# Patient Record
Sex: FEMALE | Race: WHITE | NOT HISPANIC OR LATINO | Employment: FULL TIME | URBAN - METROPOLITAN AREA
[De-identification: names, ages, dates, MRNs, and addresses within clinical notes are randomized per-mention and may not be internally consistent; named-entity substitution may affect disease eponyms.]

---
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
## 2022-04-11 ENCOUNTER — HISTORICAL (OUTPATIENT)
Dept: ADMINISTRATIVE | Facility: HOSPITAL | Age: 22
End: 2022-04-11
Payer: COMMERCIAL

## 2022-04-24 VITALS
DIASTOLIC BLOOD PRESSURE: 70 MMHG | WEIGHT: 173.75 LBS | SYSTOLIC BLOOD PRESSURE: 103 MMHG | OXYGEN SATURATION: 98 % | HEIGHT: 63 IN | BODY MASS INDEX: 30.79 KG/M2

## 2022-05-15 ENCOUNTER — OFFICE VISIT (OUTPATIENT)
Dept: URGENT CARE | Facility: CLINIC | Age: 22
End: 2022-05-15
Payer: COMMERCIAL

## 2022-05-15 VITALS
TEMPERATURE: 99 F | WEIGHT: 160 LBS | HEIGHT: 61 IN | OXYGEN SATURATION: 98 % | HEART RATE: 74 BPM | SYSTOLIC BLOOD PRESSURE: 115 MMHG | DIASTOLIC BLOOD PRESSURE: 80 MMHG | BODY MASS INDEX: 30.21 KG/M2 | RESPIRATION RATE: 16 BRPM

## 2022-05-15 DIAGNOSIS — U07.1 COVID-19: Primary | ICD-10-CM

## 2022-05-15 DIAGNOSIS — R09.81 SINUS CONGESTION: ICD-10-CM

## 2022-05-15 LAB
CTP QC/QA: YES
SARS-COV-2 RDRP RESP QL NAA+PROBE: POSITIVE

## 2022-05-15 PROCEDURE — 3074F SYST BP LT 130 MM HG: CPT | Mod: CPTII,,, | Performed by: FAMILY MEDICINE

## 2022-05-15 PROCEDURE — 3008F BODY MASS INDEX DOCD: CPT | Mod: CPTII,,, | Performed by: FAMILY MEDICINE

## 2022-05-15 PROCEDURE — 99202 PR OFFICE/OUTPT VISIT, NEW, LEVL II, 15-29 MIN: ICD-10-PCS | Mod: ,,, | Performed by: FAMILY MEDICINE

## 2022-05-15 PROCEDURE — 3008F PR BODY MASS INDEX (BMI) DOCUMENTED: ICD-10-PCS | Mod: CPTII,,, | Performed by: FAMILY MEDICINE

## 2022-05-15 PROCEDURE — 1159F MED LIST DOCD IN RCRD: CPT | Mod: CPTII,,, | Performed by: FAMILY MEDICINE

## 2022-05-15 PROCEDURE — 1160F RVW MEDS BY RX/DR IN RCRD: CPT | Mod: CPTII,,, | Performed by: FAMILY MEDICINE

## 2022-05-15 PROCEDURE — 99202 OFFICE O/P NEW SF 15 MIN: CPT | Mod: ,,, | Performed by: FAMILY MEDICINE

## 2022-05-15 PROCEDURE — 3074F PR MOST RECENT SYSTOLIC BLOOD PRESSURE < 130 MM HG: ICD-10-PCS | Mod: CPTII,,, | Performed by: FAMILY MEDICINE

## 2022-05-15 PROCEDURE — U0002: ICD-10-PCS | Mod: QW,,, | Performed by: FAMILY MEDICINE

## 2022-05-15 PROCEDURE — 1159F PR MEDICATION LIST DOCUMENTED IN MEDICAL RECORD: ICD-10-PCS | Mod: CPTII,,, | Performed by: FAMILY MEDICINE

## 2022-05-15 PROCEDURE — 1160F PR REVIEW ALL MEDS BY PRESCRIBER/CLIN PHARMACIST DOCUMENTED: ICD-10-PCS | Mod: CPTII,,, | Performed by: FAMILY MEDICINE

## 2022-05-15 PROCEDURE — 3079F DIAST BP 80-89 MM HG: CPT | Mod: CPTII,,, | Performed by: FAMILY MEDICINE

## 2022-05-15 PROCEDURE — U0002 COVID-19 LAB TEST NON-CDC: HCPCS | Mod: QW,,, | Performed by: FAMILY MEDICINE

## 2022-05-15 PROCEDURE — 3079F PR MOST RECENT DIASTOLIC BLOOD PRESSURE 80-89 MM HG: ICD-10-PCS | Mod: CPTII,,, | Performed by: FAMILY MEDICINE

## 2022-05-15 RX ORDER — FLUOXETINE 10 MG/1
10 CAPSULE ORAL DAILY
Status: ON HOLD | COMMUNITY
End: 2023-01-17

## 2022-05-15 RX ORDER — AMPHETAMINE 12.5 MG/1
TABLET, ORALLY DISINTEGRATING ORAL
Status: ON HOLD | COMMUNITY
End: 2023-01-17

## 2022-05-15 NOTE — PROGRESS NOTES
"Subjective:       Patient ID: Komal Catherine is a 21 y.o. female.    Vitals:  height is 5' 1" (1.549 m) and weight is 72.6 kg (160 lb). Her temperature is 98.8 °F (37.1 °C). Her blood pressure is 115/80 and her pulse is 74. Her respiration is 16 and oxygen saturation is 98%.     Chief Complaint: Sinus Problem    Sinus Problem  This is a new problem. The current episode started today. The problem is unchanged. Her pain is at a severity of 0/10. Associated symptoms include congestion, ear pain, sinus pressure and a sore throat. Treatments tried: nyquil. The treatment provided no relief.       HENT: Positive for ear pain, congestion, sinus pressure and sore throat.      Patient is seen and evaluated in an limited status for concern for COVID-19. In order to decrease the risk of exposure to the hospital and health care workers, only a limited exam was done.   21-year-old present to clinic with concerns of nasal congestion, bilateral ear pressure, sinus pressure and sore throat since yesterday evening.  Possible exposure to infections.  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  Vaccinated for COVID-19.  Denies chest pain shortness over or wheezing    Provider Precautions  N95 mask  Gloves    Covid Screening  No confirmation close contact  No travel to high risk area  No recent testing done    Review of Systems  Constitutional _No fever, body aches, headache  ENMT_sore throat, nasal congestion and postnasal drip  Respiratory_coughing, no shortness of breath or wheezing  Cardiovascular_no chest pain, no palpitations  Gastrointestinal_negative for nausea or vomiting  Integumentary_negative for rash  Objective:      Physical Exam    General : Alert and Oriented, No apparent distress, afebrile  Neck - supple  HENT : Oropharynx no redness or swelling.  TMs intact mild fluid no redness.   Respiratory : Bilateral equal breath sounds, nonlabored respirations  Cardiovascular : Rate, rhythm regular, normal volume pulse, no " murmur  Integumentary : Warm, Dry and no rash  Assessment:       1. COVID-19    2. Sinus congestion          Plan:       With Concern for COVID-19 infection. Swabs obtained today. COVID-19 Test positive  Adequate hydration and rest. Monitor the symptoms closely  Signs and symptoms that should prompt reevaluation discussed as well  Recommendation is to follow a timeline quarantine measures per CDC and [facility]  Tylenol as needed for fever, body aches and headache. Antihistamine of choice for congestion.   Robitussin-DM for cough and cold as needed and as directed. Can try vitamin C, zinc 50 mg, vitamin D3 5000 IU for 10 days.  At home quarantine instructions for 5 days since start of symptoms, no fever (100.4 and above) for 24 hours and with improved symptoms can stop home quarantine  Self-care and home quarantine instructions like- Wear a mask, cover your cough and sneeze. Clean any shared surfaces  Call or return to clinic for any questions. ER precautions with any acute change in symptoms. Follow up with primary MD  COVID-19    Sinus congestion  -     POCT COVID-19 Rapid Screening    Work excuse rest of the week

## 2022-11-16 ENCOUNTER — HOSPITAL ENCOUNTER (EMERGENCY)
Facility: HOSPITAL | Age: 22
Discharge: HOME | End: 2022-11-17
Attending: FAMILY MEDICINE
Payer: COMMERCIAL

## 2022-11-16 DIAGNOSIS — J02.9 VIRAL PHARYNGITIS: ICD-10-CM

## 2022-11-16 DIAGNOSIS — J02.9 SORE THROAT: Primary | ICD-10-CM

## 2022-11-16 LAB
B-HCG UR QL: NEGATIVE
CTP QC/QA: YES

## 2022-11-16 PROCEDURE — 96372 THER/PROPH/DIAG INJ SC/IM: CPT | Performed by: PHYSICIAN ASSISTANT

## 2022-11-16 PROCEDURE — 0240U COVID/FLU A&B PCR: CPT | Performed by: FAMILY MEDICINE

## 2022-11-16 PROCEDURE — 87651 STREP A DNA AMP PROBE: CPT | Performed by: FAMILY MEDICINE

## 2022-11-16 PROCEDURE — 99284 EMERGENCY DEPT VISIT MOD MDM: CPT | Mod: 25

## 2022-11-16 PROCEDURE — 63600175 PHARM REV CODE 636 W HCPCS: Performed by: PHYSICIAN ASSISTANT

## 2022-11-16 PROCEDURE — 81025 URINE PREGNANCY TEST: CPT | Performed by: FAMILY MEDICINE

## 2022-11-16 PROCEDURE — 25000003 PHARM REV CODE 250: Performed by: PHYSICIAN ASSISTANT

## 2022-11-16 RX ORDER — KETOROLAC TROMETHAMINE 30 MG/ML
30 INJECTION, SOLUTION INTRAMUSCULAR; INTRAVENOUS
Status: COMPLETED | OUTPATIENT
Start: 2022-11-16 | End: 2022-11-16

## 2022-11-16 RX ORDER — METHOCARBAMOL 100 MG/ML
500 INJECTION, SOLUTION INTRAMUSCULAR; INTRAVENOUS
Status: COMPLETED | OUTPATIENT
Start: 2022-11-16 | End: 2022-11-16

## 2022-11-16 RX ORDER — ACETAMINOPHEN 500 MG
1000 TABLET ORAL
Status: COMPLETED | OUTPATIENT
Start: 2022-11-16 | End: 2022-11-16

## 2022-11-16 RX ADMIN — ACETAMINOPHEN 1000 MG: 500 TABLET ORAL at 11:11

## 2022-11-16 RX ADMIN — METHOCARBAMOL 500 MG: 100 INJECTION, SOLUTION INTRAMUSCULAR; INTRAVENOUS at 11:11

## 2022-11-16 RX ADMIN — KETOROLAC TROMETHAMINE 30 MG: 30 INJECTION, SOLUTION INTRAMUSCULAR at 11:11

## 2022-11-16 RX ADMIN — BENZOCAINE: 200 SPRAY DENTAL; ORAL; PERIODONTAL at 11:11

## 2022-11-16 NOTE — Clinical Note
"Komal Candelario" Dorene was seen and treated in our emergency department on 11/16/2022.  She may return to work on 11/19/2022.       If you have any questions or concerns, please don't hesitate to call.      SINDHU Cortes"

## 2022-11-17 VITALS
OXYGEN SATURATION: 98 % | SYSTOLIC BLOOD PRESSURE: 108 MMHG | BODY MASS INDEX: 28.64 KG/M2 | WEIGHT: 155.63 LBS | HEART RATE: 88 BPM | HEIGHT: 62 IN | DIASTOLIC BLOOD PRESSURE: 74 MMHG | RESPIRATION RATE: 20 BRPM | TEMPERATURE: 98 F

## 2022-11-17 LAB
FLUAV AG UPPER RESP QL IA.RAPID: NOT DETECTED
FLUBV AG UPPER RESP QL IA.RAPID: NOT DETECTED
SARS-COV-2 RNA RESP QL NAA+PROBE: NOT DETECTED
STREP A PCR (OHS): NOT DETECTED

## 2022-11-17 RX ORDER — METHOCARBAMOL 500 MG/1
500 TABLET, FILM COATED ORAL 3 TIMES DAILY
Qty: 15 TABLET | Refills: 0 | Status: SHIPPED | OUTPATIENT
Start: 2022-11-17 | End: 2022-11-22

## 2022-11-17 NOTE — ED PROVIDER NOTES
Encounter Date: 11/16/2022       History     Chief Complaint   Patient presents with    Sore Throat    Nasal Congestion     Sore throat, congestion, body aches since yesterday     Patient reports to the ER with complaints of sore throat and body aches - pt reports multiple contacts with flu and strep    The history is provided by the patient.   Sore Throat   This is a new problem. The sore throat symptoms include sore throat.The current episode started yesterday. The problem has been unchanged. There has been no fever. Associated symptoms include a hoarse voice and swollen glands. Pertinent negatives include no abdominal pain, congestion, coughing, neck pain, shortness of breath or vomiting. She has had exposure to strep. She has had no exposure to mono.   Review of patient's allergies indicates:  No Known Allergies  Past Medical History:   Diagnosis Date    ADHD (attention deficit hyperactivity disorder)     Anxiety      Past Surgical History:   Procedure Laterality Date    NOSE SURGERY      WISDOM TOOTH EXTRACTION       Family History   Problem Relation Age of Onset    No Known Problems Mother     No Known Problems Father     Breast cancer Neg Hx     Colon cancer Neg Hx     Ovarian cancer Neg Hx      Social History     Tobacco Use    Smoking status: Never    Smokeless tobacco: Never   Substance Use Topics    Alcohol use: Yes     Comment: occasional    Drug use: Yes     Types: Marijuana     Review of Systems   Constitutional:  Negative for fever.   HENT:  Positive for hoarse voice and sore throat. Negative for congestion.    Respiratory:  Negative for cough and shortness of breath.    Cardiovascular:  Negative for chest pain.   Gastrointestinal:  Negative for abdominal pain, nausea and vomiting.   Genitourinary:  Negative for dysuria.   Musculoskeletal:  Negative for back pain and neck pain.   Skin:  Negative for rash.   Neurological:  Negative for weakness.   Hematological:  Does not bruise/bleed easily.    Psychiatric/Behavioral: Negative.       Physical Exam     Initial Vitals   BP Pulse Resp Temp SpO2   11/16/22 2239 11/16/22 2239 11/16/22 2239 11/16/22 2240 11/16/22 2239   103/75 (!) 113 18 99.3 °F (37.4 °C) 100 %      MAP       --                Physical Exam    Vitals reviewed.  Constitutional: She appears well-developed and well-nourished.   HENT:   Head: Normocephalic and atraumatic. Head is without raccoon's eyes and without Romo's sign.   Mouth/Throat: Posterior oropharyngeal erythema present. No oropharyngeal exudate or posterior oropharyngeal edema.   Eyes: Conjunctivae and EOM are normal. Pupils are equal, round, and reactive to light.   Neck: Neck supple.   Normal range of motion.  Cardiovascular:  Normal rate, regular rhythm and normal heart sounds.           Pulmonary/Chest: Breath sounds normal. No respiratory distress. She has no wheezes. She exhibits no tenderness.   Abdominal: Abdomen is soft. Bowel sounds are normal. There is no abdominal tenderness.   Musculoskeletal:         General: Normal range of motion.      Cervical back: Normal range of motion and neck supple.     Neurological: She is alert and oriented to person, place, and time. She displays normal reflexes. No cranial nerve deficit or sensory deficit.   Skin: Skin is warm and dry.   Psychiatric: She has a normal mood and affect. Her behavior is normal. Judgment and thought content normal.       ED Course   Procedures  Labs Reviewed   COVID/FLU A&B PCR - Normal    Narrative:     The Xpert Xpress SARS-CoV-2/FLU/RSV plus is a rapid, multiplexed real-time PCR test intended for the simultaneous qualitative detection and differentiation of SARS-CoV-2, Influenza A, Influenza B, and respiratory syncytial virus (RSV) viral RNA in either nasopharyngeal swab or nasal swab specimens.         STREP GROUP A BY PCR - Normal    Narrative:     The Xpert Xpress Strep A test is a rapid, qualitative in vitro diagnostic test for the detection of  Streptococcus pyogenes (Group A ß-hemolytic Streptococcus, Strep A) in throat swab specimens from patients with signs and symptoms of pharyngitis.     POCT URINE PREGNANCY          Imaging Results    None          Medications   acetaminophen tablet 1,000 mg (1,000 mg Oral Given 11/16/22 2348)   ketorolac injection 30 mg (30 mg Intramuscular Given 11/16/22 2348)   methocarbamoL injection 500 mg (500 mg Intramuscular Given 11/16/22 2348)   benzocaine 20 % mouth spray ( Mouth/Throat Given 11/16/22 2348)                 ED Course as of 11/17/22 0107   Thu Nov 17, 2022   0047 Paltient reports improvement of symptoms [AL]      ED Course User Index  [AL] SINDHU Cortes                 Clinical Impression:   Final diagnoses:  [J02.9] Sore throat (Primary)  [J02.9] Viral pharyngitis      ED Disposition Condition    Discharge Stable          ED Prescriptions       Medication Sig Dispense Start Date End Date Auth. Provider    benzocaine-menthoL 6-10 mg lozenge Take 1 lozenge by mouth every 4 (four) hours as needed for Pain. 12 tablet 11/17/2022 11/20/2022 SINDHU Cortes    methocarbamoL (ROBAXIN) 500 MG Tab Take 1 tablet (500 mg total) by mouth 3 (three) times daily. for 5 days 15 tablet 11/17/2022 11/22/2022 SINDHU Cortes          Follow-up Information       Follow up With Specialties Details Why Contact Info    Hussein Charles PA-C Family [facility]   [address]  [facility]  [place] [address]  [phone]      discharge followup    If your symptoms become WORSE or you DO NOT IMPROVE and you are unable to reach your health care provider, you should RETURN to the emergency department    discharge info    Discussed all pertinent ED information, results, diagnosis and treatment plan; All questions and concerns were addressed at this time. Patient voices understanding of information and instructions. Patient is comfortable with plan and discharge             SINDHU Cortes  11/17/22 0107

## 2022-12-26 ENCOUNTER — HOSPITAL ENCOUNTER (OUTPATIENT)
Facility: HOSPITAL | Age: 22
Discharge: HOME | End: 2022-12-27
Attending: FAMILY MEDICINE | Admitting: INTERNAL MEDICINE
Payer: COMMERCIAL

## 2022-12-26 DIAGNOSIS — R06.02 SOB (SHORTNESS OF BREATH): Primary | ICD-10-CM

## 2022-12-26 DIAGNOSIS — Z91.89 AT RISK FOR LONG QT SYNDROME: ICD-10-CM

## 2022-12-26 DIAGNOSIS — D72.829 LEUKOCYTOSIS, UNSPECIFIED TYPE: ICD-10-CM

## 2022-12-26 DIAGNOSIS — R07.9 CHEST PAIN: ICD-10-CM

## 2022-12-26 DIAGNOSIS — R09.02 HYPOXEMIA: ICD-10-CM

## 2022-12-26 LAB
ALBUMIN SERPL-MCNC: 3.5 G/DL (ref 3.5–5)
ALBUMIN/GLOB SERPL: 0.8 RATIO (ref 1.1–2)
ALP SERPL-CCNC: 63 UNIT/L (ref 40–150)
ALT SERPL-CCNC: 14 UNIT/L (ref 0–55)
AST SERPL-CCNC: 16 UNIT/L (ref 5–34)
B PERT.PT PRMT NPH QL NAA+NON-PROBE: NOT DETECTED
B-HCG SERPL QL: NEGATIVE
BASOPHILS # BLD AUTO: 0.09 X10(3)/MCL (ref 0–0.2)
BASOPHILS NFR BLD AUTO: 0.6 %
BILIRUBIN DIRECT+TOT PNL SERPL-MCNC: 0.8 MG/DL
BUN SERPL-MCNC: 11 MG/DL (ref 7–18.7)
C PNEUM DNA NPH QL NAA+NON-PROBE: NOT DETECTED
CALCIUM SERPL-MCNC: 9.6 MG/DL (ref 8.4–10.2)
CHLORIDE SERPL-SCNC: 105 MMOL/L (ref 98–107)
CO2 SERPL-SCNC: 22 MMOL/L (ref 22–29)
CREAT SERPL-MCNC: 0.97 MG/DL (ref 0.55–1.02)
D DIMER PPP IA.FEU-MCNC: 0.31 UG/ML FEU (ref 0–0.5)
EOSINOPHIL # BLD AUTO: 1.45 X10(3)/MCL (ref 0–0.9)
EOSINOPHIL NFR BLD AUTO: 9.1 %
ERYTHROCYTE [DISTWIDTH] IN BLOOD BY AUTOMATED COUNT: 13.6 % (ref 11–14.5)
EST. AVERAGE GLUCOSE BLD GHB EST-MCNC: 96.8 MG/DL
FLUAV AG UPPER RESP QL IA.RAPID: NOT DETECTED
FLUBV AG UPPER RESP QL IA.RAPID: NOT DETECTED
GFR SERPLBLD CREATININE-BSD FMLA CKD-EPI: >60 MLS/MIN/1.73/M2
GLOBULIN SER-MCNC: 4.2 GM/DL (ref 2.4–3.5)
GLUCOSE SERPL-MCNC: 104 MG/DL (ref 74–100)
HADV DNA NPH QL NAA+NON-PROBE: NOT DETECTED
HBA1C MFR BLD: 5 %
HCOV 229E RNA NPH QL NAA+NON-PROBE: NOT DETECTED
HCOV HKU1 RNA NPH QL NAA+NON-PROBE: NOT DETECTED
HCOV NL63 RNA NPH QL NAA+NON-PROBE: NOT DETECTED
HCOV OC43 RNA NPH QL NAA+NON-PROBE: NOT DETECTED
HCT VFR BLD AUTO: 43.8 % (ref 37–47)
HGB BLD-MCNC: 14.4 GM/DL (ref 12–16)
HMPV RNA NPH QL NAA+NON-PROBE: NOT DETECTED
HPIV1 RNA NPH QL NAA+NON-PROBE: NOT DETECTED
HPIV2 RNA NPH QL NAA+NON-PROBE: NOT DETECTED
HPIV3 RNA NPH QL NAA+NON-PROBE: NOT DETECTED
HPIV4 RNA NPH QL NAA+NON-PROBE: NOT DETECTED
IMM GRANULOCYTES # BLD AUTO: 0.05 X10(3)/MCL (ref 0–0.04)
IMM GRANULOCYTES NFR BLD AUTO: 0.3 %
LACTATE SERPL-SCNC: 1.7 MMOL/L (ref 0.5–2.2)
LACTATE SERPL-SCNC: 2.3 MMOL/L (ref 0.5–2.2)
LACTATE SERPL-SCNC: 3.1 MMOL/L (ref 0.5–2.2)
LYMPHOCYTES # BLD AUTO: 2.07 X10(3)/MCL (ref 0.6–4.6)
LYMPHOCYTES NFR BLD AUTO: 12.9 %
M PNEUMO DNA NPH QL NAA+NON-PROBE: NOT DETECTED
MCH RBC QN AUTO: 30.7 PG
MCHC RBC AUTO-ENTMCNC: 32.9 MG/DL (ref 33–36)
MCV RBC AUTO: 93.4 FL (ref 80–94)
MONOCYTES # BLD AUTO: 1.28 X10(3)/MCL (ref 0.1–1.3)
MONOCYTES NFR BLD AUTO: 8 %
NEUTROPHILS # BLD AUTO: 11.06 X10(3)/MCL (ref 2.1–9.2)
NEUTROPHILS NFR BLD AUTO: 69.1 %
NRBC BLD AUTO-RTO: 0 % (ref 0–1)
PLATELET # BLD AUTO: 276 X10(3)/MCL (ref 140–371)
PMV BLD AUTO: 10.2 FL (ref 9.4–12.4)
POCT GLUCOSE: 125 MG/DL (ref 70–110)
POCT GLUCOSE: 135 MG/DL (ref 70–110)
POCT GLUCOSE: 157 MG/DL (ref 70–110)
POCT GLUCOSE: 183 MG/DL (ref 70–110)
POTASSIUM SERPL-SCNC: 3.9 MMOL/L (ref 3.5–5.1)
PROT SERPL-MCNC: 7.7 GM/DL (ref 6.4–8.3)
RBC # BLD AUTO: 4.69 X10(6)/MCL (ref 4.2–5.4)
RSV RNA NPH QL NAA+NON-PROBE: NOT DETECTED
RV+EV RNA NPH QL NAA+NON-PROBE: NOT DETECTED
SARS-COV-2 RNA RESP QL NAA+PROBE: NOT DETECTED
SODIUM SERPL-SCNC: 137 MMOL/L (ref 136–145)
TSH SERPL-ACNC: 0.75 UIU/ML (ref 0.35–4.94)
WBC # SPEC AUTO: 16 X10(3)/MCL (ref 4.5–11.5)

## 2022-12-26 PROCEDURE — 94640 AIRWAY INHALATION TREATMENT: CPT | Mod: XB

## 2022-12-26 PROCEDURE — G0378 HOSPITAL OBSERVATION PER HR: HCPCS

## 2022-12-26 PROCEDURE — 96367 TX/PROPH/DG ADDL SEQ IV INF: CPT

## 2022-12-26 PROCEDURE — 83605 ASSAY OF LACTIC ACID: CPT

## 2022-12-26 PROCEDURE — 25000242 PHARM REV CODE 250 ALT 637 W/ HCPCS: Performed by: FAMILY MEDICINE

## 2022-12-26 PROCEDURE — 96361 HYDRATE IV INFUSION ADD-ON: CPT

## 2022-12-26 PROCEDURE — 0240U COVID/FLU A&B PCR: CPT | Performed by: FAMILY MEDICINE

## 2022-12-26 PROCEDURE — 99285 EMERGENCY DEPT VISIT HI MDM: CPT | Mod: 25

## 2022-12-26 PROCEDURE — 85379 FIBRIN DEGRADATION QUANT: CPT | Performed by: FAMILY MEDICINE

## 2022-12-26 PROCEDURE — 96365 THER/PROPH/DIAG IV INF INIT: CPT

## 2022-12-26 PROCEDURE — 63600175 PHARM REV CODE 636 W HCPCS: Performed by: FAMILY MEDICINE

## 2022-12-26 PROCEDURE — 87040 BLOOD CULTURE FOR BACTERIA: CPT

## 2022-12-26 PROCEDURE — 85025 COMPLETE CBC W/AUTO DIFF WBC: CPT | Performed by: FAMILY MEDICINE

## 2022-12-26 PROCEDURE — 83036 HEMOGLOBIN GLYCOSYLATED A1C: CPT

## 2022-12-26 PROCEDURE — 25000003 PHARM REV CODE 250

## 2022-12-26 PROCEDURE — 80053 COMPREHEN METABOLIC PANEL: CPT | Performed by: FAMILY MEDICINE

## 2022-12-26 PROCEDURE — 84703 CHORIONIC GONADOTROPIN ASSAY: CPT | Performed by: FAMILY MEDICINE

## 2022-12-26 PROCEDURE — 96372 THER/PROPH/DIAG INJ SC/IM: CPT

## 2022-12-26 PROCEDURE — 82962 GLUCOSE BLOOD TEST: CPT

## 2022-12-26 PROCEDURE — 27000221 HC OXYGEN, UP TO 24 HOURS

## 2022-12-26 PROCEDURE — 63600175 PHARM REV CODE 636 W HCPCS

## 2022-12-26 PROCEDURE — 94640 AIRWAY INHALATION TREATMENT: CPT

## 2022-12-26 PROCEDURE — 0202U NFCT DS 22 TRGT SARS-COV-2: CPT

## 2022-12-26 PROCEDURE — 25000242 PHARM REV CODE 250 ALT 637 W/ HCPCS

## 2022-12-26 PROCEDURE — 84443 ASSAY THYROID STIM HORMONE: CPT

## 2022-12-26 RX ORDER — SODIUM CHLORIDE 9 MG/ML
1000 INJECTION, SOLUTION INTRAVENOUS ONCE
Status: COMPLETED | OUTPATIENT
Start: 2022-12-26 | End: 2022-12-26

## 2022-12-26 RX ORDER — NALOXONE HCL 0.4 MG/ML
0.02 VIAL (ML) INJECTION
Status: DISCONTINUED | OUTPATIENT
Start: 2022-12-26 | End: 2022-12-27 | Stop reason: HOSPADM

## 2022-12-26 RX ORDER — ENOXAPARIN SODIUM 100 MG/ML
40 INJECTION SUBCUTANEOUS EVERY 24 HOURS
Status: DISCONTINUED | OUTPATIENT
Start: 2022-12-26 | End: 2022-12-27 | Stop reason: HOSPADM

## 2022-12-26 RX ORDER — ALBUTEROL SULFATE 0.83 MG/ML
2.5 SOLUTION RESPIRATORY (INHALATION) EVERY 4 HOURS PRN
Status: DISCONTINUED | OUTPATIENT
Start: 2022-12-26 | End: 2022-12-27 | Stop reason: HOSPADM

## 2022-12-26 RX ORDER — PREDNISONE 20 MG/1
40 TABLET ORAL DAILY
Status: DISCONTINUED | OUTPATIENT
Start: 2022-12-26 | End: 2022-12-27 | Stop reason: HOSPADM

## 2022-12-26 RX ORDER — IBUPROFEN 200 MG
16 TABLET ORAL
Status: DISCONTINUED | OUTPATIENT
Start: 2022-12-26 | End: 2022-12-27 | Stop reason: HOSPADM

## 2022-12-26 RX ORDER — IPRATROPIUM BROMIDE AND ALBUTEROL SULFATE 2.5; .5 MG/3ML; MG/3ML
SOLUTION RESPIRATORY (INHALATION)
Status: DISPENSED
Start: 2022-12-26 | End: 2022-12-26

## 2022-12-26 RX ORDER — ALBUTEROL SULFATE 0.83 MG/ML
2.5 SOLUTION RESPIRATORY (INHALATION)
Status: COMPLETED | OUTPATIENT
Start: 2022-12-26 | End: 2022-12-26

## 2022-12-26 RX ORDER — METHYLPREDNISOLONE SOD SUCC 125 MG
125 VIAL (EA) INJECTION
Status: COMPLETED | OUTPATIENT
Start: 2022-12-26 | End: 2022-12-26

## 2022-12-26 RX ORDER — INSULIN ASPART 100 [IU]/ML
0-5 INJECTION, SOLUTION INTRAVENOUS; SUBCUTANEOUS
Status: DISCONTINUED | OUTPATIENT
Start: 2022-12-26 | End: 2022-12-27 | Stop reason: HOSPADM

## 2022-12-26 RX ORDER — IPRATROPIUM BROMIDE AND ALBUTEROL SULFATE 2.5; .5 MG/3ML; MG/3ML
3 SOLUTION RESPIRATORY (INHALATION)
Status: COMPLETED | OUTPATIENT
Start: 2022-12-26 | End: 2022-12-26

## 2022-12-26 RX ORDER — MAGNESIUM SULFATE HEPTAHYDRATE 40 MG/ML
2 INJECTION, SOLUTION INTRAVENOUS
Status: COMPLETED | OUTPATIENT
Start: 2022-12-26 | End: 2022-12-26

## 2022-12-26 RX ORDER — FLUTICASONE FUROATE AND VILANTEROL 100; 25 UG/1; UG/1
1 POWDER RESPIRATORY (INHALATION) DAILY
Status: DISCONTINUED | OUTPATIENT
Start: 2022-12-26 | End: 2022-12-27 | Stop reason: HOSPADM

## 2022-12-26 RX ORDER — IBUPROFEN 200 MG
24 TABLET ORAL
Status: DISCONTINUED | OUTPATIENT
Start: 2022-12-26 | End: 2022-12-27 | Stop reason: HOSPADM

## 2022-12-26 RX ORDER — GLUCAGON 1 MG
1 KIT INJECTION
Status: DISCONTINUED | OUTPATIENT
Start: 2022-12-26 | End: 2022-12-27 | Stop reason: HOSPADM

## 2022-12-26 RX ORDER — SODIUM CHLORIDE 0.9 % (FLUSH) 0.9 %
10 SYRINGE (ML) INJECTION EVERY 12 HOURS PRN
Status: DISCONTINUED | OUTPATIENT
Start: 2022-12-26 | End: 2022-12-27 | Stop reason: HOSPADM

## 2022-12-26 RX ADMIN — SODIUM CHLORIDE 1000 ML: 9 INJECTION, SOLUTION INTRAVENOUS at 10:12

## 2022-12-26 RX ADMIN — METHYLPREDNISOLONE SODIUM SUCCINATE 125 MG: 125 INJECTION, POWDER, FOR SOLUTION INTRAMUSCULAR; INTRAVENOUS at 04:12

## 2022-12-26 RX ADMIN — ENOXAPARIN SODIUM 40 MG: 100 INJECTION SUBCUTANEOUS at 05:12

## 2022-12-26 RX ADMIN — ALBUTEROL SULFATE 2.5 MG: 2.5 SOLUTION RESPIRATORY (INHALATION) at 08:12

## 2022-12-26 RX ADMIN — IPRATROPIUM BROMIDE AND ALBUTEROL SULFATE 3 ML: 2.5; .5 SOLUTION RESPIRATORY (INHALATION) at 06:12

## 2022-12-26 RX ADMIN — ALBUTEROL SULFATE 2.5 MG: 2.5 SOLUTION RESPIRATORY (INHALATION) at 11:12

## 2022-12-26 RX ADMIN — FLUTICASONE FUROATE AND VILANTEROL TRIFENATATE 1 PUFF: 100; 25 POWDER RESPIRATORY (INHALATION) at 11:12

## 2022-12-26 RX ADMIN — AZITHROMYCIN MONOHYDRATE 500 MG: 500 INJECTION, POWDER, LYOPHILIZED, FOR SOLUTION INTRAVENOUS at 11:12

## 2022-12-26 RX ADMIN — MAGNESIUM SULFATE HEPTAHYDRATE 2 G: 40 INJECTION, SOLUTION INTRAVENOUS at 06:12

## 2022-12-26 RX ADMIN — PREDNISONE 40 MG: 20 TABLET ORAL at 08:12

## 2022-12-26 RX ADMIN — ALBUTEROL SULFATE 2.5 MG: 2.5 SOLUTION RESPIRATORY (INHALATION) at 02:12

## 2022-12-26 RX ADMIN — ALBUTEROL SULFATE 2.5 MG: 2.5 SOLUTION RESPIRATORY (INHALATION) at 04:12

## 2022-12-26 RX ADMIN — SODIUM CHLORIDE 1 G: 900 INJECTION INTRAVENOUS at 10:12

## 2022-12-26 RX ADMIN — SODIUM CHLORIDE, POTASSIUM CHLORIDE, SODIUM LACTATE AND CALCIUM CHLORIDE 1000 ML: 600; 310; 30; 20 INJECTION, SOLUTION INTRAVENOUS at 08:12

## 2022-12-26 NOTE — LETTER
[address] [facility] [address]  Phone: [phone]  Fax: [phone]           Patient: Komal Catherine   YOB: 2000   Today's Date: December 27, 2022       To Whom It May Concern:    This notice verifies that Komal Catherine was hospitalized on 12/26/2022 to 12/27/2022.        Sincerely,    Fito'Clarita Steret LPN

## 2022-12-26 NOTE — ED PROVIDER NOTES
Encounter Date: 12/26/2022       History     Chief Complaint   Patient presents with    Hypoxia     Pt c/o sob, chest tightness x 2-3 days, reports much worse last 6 hours, took pulse ox at home and reports it was 83%, 92% in triage, put pt on 2 liters in triage. Has been taking otc meds for symptoms. Otherwise vss.      22-year-old female presents emergency room complaints of shortness of breath, wheezing that has been ongoing for last 2-3 days.  Patient reports she became concerned when using an oxygen monitor at home and noting that she was only 83%.  In triage, patient 92%.  Patient reports cough with wheezing; symptoms worse with coughing and exertion, better with rest.  Patient denies any history of asthma in the past.    The history is provided by the patient.   Review of patient's allergies indicates:  No Known Allergies  Past Medical History:   Diagnosis Date    ADHD (attention deficit hyperactivity disorder)     Anxiety      Past Surgical History:   Procedure Laterality Date    NOSE SURGERY      WISDOM TOOTH EXTRACTION       Family History   Problem Relation Age of Onset    No Known Problems Mother     No Known Problems Father     Breast cancer Neg Hx     Colon cancer Neg Hx     Ovarian cancer Neg Hx      Social History     Tobacco Use    Smoking status: Never    Smokeless tobacco: Never   Substance Use Topics    Alcohol use: Yes     Comment: occasional    Drug use: Yes     Types: Marijuana     Review of Systems   Constitutional:  Negative for chills, fatigue and fever.   HENT:  Negative for ear pain, rhinorrhea and sore throat.    Eyes:  Negative for photophobia and pain.   Respiratory:  Positive for cough, shortness of breath and wheezing. Negative for stridor.    Cardiovascular:  Negative for chest pain.   Gastrointestinal:  Negative for abdominal pain, diarrhea, nausea and vomiting.   Genitourinary:  Negative for dysuria.   Neurological:  Negative for dizziness, weakness and headaches.   All other  systems reviewed and are negative.    Physical Exam     Initial Vitals [12/26/22 0354]   BP Pulse Resp Temp SpO2   (!) 147/82 (!) 117 (!) 28 98.8 °F (37.1 °C) (!) 92 %      MAP       --         Physical Exam    Nursing note and vitals reviewed.  Constitutional: She appears well-developed and well-nourished. No distress.   HENT:   Head: Normocephalic and atraumatic.   Mouth/Throat: Oropharynx is clear and moist. No oropharyngeal exudate.   Eyes: Conjunctivae and EOM are normal. Pupils are equal, round, and reactive to light.   Neck: Neck supple.   Normal range of motion.  Cardiovascular:  Normal rate, regular rhythm, normal heart sounds and intact distal pulses.           Pulmonary/Chest: No respiratory distress. She has wheezes. She has rhonchi. She has no rales.   Abdominal: Abdomen is soft. Bowel sounds are normal. She exhibits no distension. There is no abdominal tenderness. There is no rebound and no guarding.   Musculoskeletal:         General: Normal range of motion.      Cervical back: Normal range of motion and neck supple.     Neurological: She is alert and oriented to person, place, and time.   Skin: Skin is warm and dry. Capillary refill takes less than 2 seconds. No erythema.   Psychiatric: She has a normal mood and affect. Her behavior is normal. Judgment and thought content normal.       ED Course   Procedures  Labs Reviewed   COMPREHENSIVE METABOLIC PANEL - Abnormal; Notable for the following components:       Result Value    Glucose Level 104 (*)     Globulin 4.2 (*)     Albumin/Globulin Ratio 0.8 (*)     All other components within normal limits   CBC WITH DIFFERENTIAL - Abnormal; Notable for the following components:    WBC 16.0 (*)     MCHC 32.9 (*)     Neut # 11.06 (*)     Eos # 1.45 (*)     IG# 0.05 (*)     All other components within normal limits   LACTIC ACID, PLASMA - Abnormal; Notable for the following components:    Lactic Acid Level 2.3 (*)     All other components within normal limits    LACTIC ACID, PLASMA - Abnormal; Notable for the following components:    Lactic Acid Level 3.1 (*)     All other components within normal limits   POCT GLUCOSE - Abnormal; Notable for the following components:    POCT Glucose 157 (*)     All other components within normal limits   COVID/FLU A&B PCR - Normal    Narrative:     The Xpert Xpress SARS-CoV-2/FLU/RSV plus is a rapid, multiplexed real-time PCR test intended for the simultaneous qualitative detection and differentiation of SARS-CoV-2, Influenza A, Influenza B, and respiratory syncytial virus (RSV) viral RNA in either nasopharyngeal swab or nasal swab specimens.         D DIMER, QUANTITATIVE - Normal   HCG, SERUM, QUALITATIVE - Normal   RESPIRATORY PANEL - Normal    Narrative:     The BioFire Respiratory Panel 2.1 (RP2.1) is a PCR-based multiplexed nucleic acid test intended for use with the BioFire® 2.0 for simultaneous qualitative detection and identification of multiple respiratory viral and bacterial nucleic acids in nasopharyngeal swabs (NPS) obtained from individuals suspected of respiratory tract infections.   TSH - Normal   LACTIC ACID, PLASMA - Normal   CBC W/ AUTO DIFFERENTIAL    Narrative:     The following orders were created for panel order CBC Auto Differential.  Procedure                               Abnormality         Status                     ---------                               -----------         ------                     CBC with Differential[[id number]]        Abnormal            Final result                 Please view results for these tests on the individual orders.   HEMOGLOBIN A1C   EXTRA TUBES    Narrative:     The following orders were created for panel order EXTRA TUBES.  Procedure                               Abnormality         Status                     ---------                               -----------         ------                     Light Blue Top Hold[[id number]]                              In process                  Lavender Top Hold[[id number]]                                In process                   Please view results for these tests on the individual orders.   LIGHT BLUE TOP HOLD   LAVENDER TOP HOLD   EXTRA TUBES    Narrative:     The following orders were created for panel order EXTRA TUBES.  Procedure                               Abnormality         Status                     ---------                               -----------         ------                     Light Green Top Hold[[id number]]                             In process                 Gold Top Hold[[id number]]                                    In process                   Please view results for these tests on the individual orders.   LIGHT GREEN TOP HOLD   GOLD TOP HOLD   EXTRA TUBES    Narrative:     The following orders were created for panel order EXTRA TUBES.  Procedure                               Abnormality         Status                     ---------                               -----------         ------                     Red Top Hold[[id number]]                                     In process                   Please view results for these tests on the individual orders.   RED TOP HOLD        ECG Results    None       Imaging Results              X-Ray Chest 1 View (Final result)  Result time 12/26/22 09:36:31      Final result by Azam Peralta MD (12/26/22 09:36:31)                   Impression:      NO ACUTE CARDIOPULMONARY PROCESS IDENTIFIED.      Electronically signed by: Azam Peralta  Date:    12/26/2022  Time:    09:36               Narrative:    EXAMINATION:  XR CHEST 1 VIEW    CLINICAL HISTORY:  Dyspnea;    TECHNIQUE:  One view    COMPARISON:  None available.    FINDINGS:  Cardiopericardial silhouette is within normal limits. Lungs are without dense focal or segmental consolidation, congestive process, pleural effusions or pneumothorax.                        ED Interpretation by Patel Medina MD (12/26/22 05:25:35,  [facility] Dept, Emergency Medicine)    No acute abnormality appreciated.                                     Medications   albuterol-ipratropium (DUO-NEB) 2.5 mg-0.5 mg/3 mL nebulizer solution (  Back Association 12/26/22 0645)   methylPREDNISolone sodium succinate injection 125 mg (125 mg Intravenous Given 12/26/22 0424)   albuterol nebulizer solution 2.5 mg (2.5 mg Nebulization Given 12/26/22 0424)   albuterol-ipratropium 2.5 mg-0.5 mg/3 mL nebulizer solution 3 mL (3 mLs Nebulization Given 12/26/22 0638)   magnesium sulfate 2g in water 50mL IVPB (premix) (0 g Intravenous Stopped 12/26/22 0659)   lactated ringers bolus 1,000 mL (0 mLs Intravenous Stopped 12/26/22 0925)   cefTRIAXone (ROCEPHIN) 1 g in sodium chloride 0.9 % 50 mL IVPB (MB+) (0 g Intravenous Stopped 12/26/22 1114)   0.9%  NaCl infusion (0 mLs Intravenous Stopped 12/26/22 1146)     Medical Decision Making:   Differential Diagnosis:   Asthma, Bronchitis, noted a brought this would been fighting with some a people at people last night ER           ED Course as of 01/09/23 0431   Mon Dec 26, 2022   0532 Sodium: 137 [MW]   0532 Potassium: 3.9 [MW]   0532 Chloride: 105 [MW]   0532 CO2: 22 [MW]   0532 Glucose(!): 104 [MW]   0532 BUN: 11.0 [MW]   0532 Creatinine: 0.97 [MW]   0532 Globulin, Total(!): 4.2 [MW]   0532 Albumin: 3.5 [MW]   0619 Patient feeling improved.  Oxygen saturation improved, currently 98% on 2 L. no acute abnormality noted on chest x-ray.  Patient still has some faint expiratory wheezing-will give additional nebulizer treatment along with magnesium.  Will continue to monitor. [MW]   0619 D-Dimer: 0.31 [MW]   0659 Patient transitioned to SINDHU Bridges [MW]   0708 Nurse taking care of patient came to inform me that after approx 5 minutes of being off of O2 patient's sats dropped back into mid 80s. Requested nurse place patient back on 2 L O2 & will consult IM for admission. [NB]   3959 Patient's sats improved to 97%  once placed back on 2 L NC. Patient reports feeling much better. Discussed patient's PMHx & while she denies formal asthma diagnosis, reports she has hx of wheezing along w/ family hx of asthma & personal hx of seasonal allergies & eczema. No signs of respiratory distress at this time. Patient is amenable to admission. [NB]      ED Course User Index  [MW] Patel Medina MD  [NB] SINDHU Roberts                 Clinical Impression:   Final diagnoses:  [R06.02] SOB (shortness of breath) (Primary)  [R09.02] Hypoxemia  [D72.829] Leukocytosis, unspecified type        ED Disposition Condition    Observation                 Patel Medina MD  01/09/23 043

## 2022-12-26 NOTE — ED NOTES
Pt states having SOB x2 days got worse last night. States she was 83% at home pt was hypoxic in triage.

## 2022-12-26 NOTE — H&P
History and Physical     Date of Admit: 12/26/2022  Current Hospital Day: 1     Subjective:      Brief HPI:  Komal Catherine is a 22 y.o. female who  has a past medical history of ADHD (attention deficit hyperactivity disorder) and Anxiety.  She presented to [facility] ED on 12/26/2022  with a primary complaint of SOB onset 3 days ago but was worse this AM around 0300. Pt reports checking spo2 at home in low 80% and was concerned so she came in. Associated sx include wheezing, cough, and chest tightness. Reports similar sx before when she was dancing and had COVID, seen and discharged with albuterol inhaler. No prior hospitalizations for respiratory sx. She tried her inhaler but no relief to sx. Pt states she smokes tobacco and marijuanna occasionally but not in the past 3 days. Denies any contact with others with respiratory sx. No fever, sore throat, abd pain, hemoptysis, leg pain/swelling, N/V/D, congestion, rhinorrhea.    Pt got 2 rounds of breathing tx, solumedrol in ED and is on 2L O2. When trying to wean pt off O2 she desatted to upper 80%. Admit for acute respiratory failure requiring O2. Pt also tachypneic, tachycardic with elevated WBC, neutrophil counts.     Past Medical History:   Diagnosis Date    ADHD (attention deficit hyperactivity disorder)     Anxiety        Past Surgical History:   Procedure Laterality Date    NOSE SURGERY      WISDOM TOOTH EXTRACTION         Review of patient's allergies indicates:  No Known Allergies    Current Outpatient Medications   Medication Instructions    amitriptyline (ELAVIL) 10 mg, Oral, Daily    amphetamine (ADZENYS XR-ODT) 12.5 mg TbLB Oral    citalopram (CELEXA) 40 mg, Oral, Daily    ethynodiol-ethinyl estradiol (KELNOR) 1-35 mg-mcg per tablet 1 tablet, Oral, Daily    FLUoxetine 10 mg, Oral, Daily        Family History       Problem Relation (Age of Onset)    No Known Problems Mother, Father            Tobacco Use    Smoking status: Never    Smokeless tobacco: Never    Substance and Sexual Activity    Alcohol use: Yes     Comment: occasional    Drug use: Yes     Types: Marijuana    Sexual activity: Yes     Partners: Female     Birth control/protection: None     Comment: single        Review of Systems:  Review of Systems   Constitutional:  Negative for chills and fever.   Respiratory:  Positive for cough, shortness of breath and wheezing. Negative for hemoptysis and sputum production.    Cardiovascular:  Negative for chest pain, palpitations and leg swelling.        (+) chest tightness   Gastrointestinal:  Negative for abdominal pain, nausea and vomiting.   Genitourinary:  Negative for flank pain and hematuria.   Skin:  Negative for itching and rash.   Neurological:  Negative for weakness and headaches.   Endo/Heme/Allergies:  Does not bruise/bleed easily.        Objective:     Vital Signs:  Vital Signs (Most Recent):  Temp: 98.8 °F (37.1 °C) (12/26/22 0354)  Pulse: 107 (12/26/22 1100)  Resp: 19 (12/26/22 1100)  BP: 109/74 (12/26/22 0704)  SpO2: 97 % (12/26/22 1100)   Vital Signs (24h Range):  Temp:  [98.8 °F (37.1 °C)] 98.8 °F (37.1 °C)  Pulse:  [] 107  Resp:  [18-28] 19  SpO2:  [90 %-99 %] 97 %  BP: (102-147)/(60-82) 109/74   Body mass index is 31.09 kg/m².   No intake or output data in the 24 hours ending 12/26/22 1121    Physical Examination:  General:  Well developed, well nourished, no acute distress  Head: Normocephalic, atraumatic  ENT: PERRL, MMM. No erythema to posterior OP  Neck: supple, normal ROM, no JVD  CVS:  tachycardic. Regular rhythm. S1 and S2 normal. No murmurs, rubs, or gallops. Regular peripheral pulses. No peripheral edema  Resp:  Lungs diffuse wheezing to auscultation bilaterally, no rales, or rhonchi. Normal respiratory effort. 96% O2 on 2L via nasal canula  GI:  Abdomen soft, non-tender, non-distended, normoactive bowel sounds  MSK:  Full range of motion, no obvious deformities  Skin:  No rashes, ulcers, erythema  Neuro:  Alert and oriented x3, No  focal neuro deficits, CNII-XII grossly intact       Laboratory:    Recent Labs   Lab 12/26/22 0439   WBC 16.0*   HGB 14.4   HCT 43.8      MCV 93.4   RDW 13.6     No results for input(s): TROPONINI, CKTOTAL, CKMB, BNP in the last 24 hours.  No results for input(s): TROPONINI, CKTOTAL, CKMB, BNP in the last 168 hours.  No results for input(s): CHOL, HDL, LDLCALC, TRIG, CHOLHDL in the last 168 hours. Recent Labs   Lab 12/26/22 0439      K 3.9   CO2 22   BUN 11.0   CREATININE 0.97   CALCIUM 9.6     Recent Labs   Lab 12/26/22 0439   ALBUMIN 3.5   BILITOT 0.8   AST 16   ALKPHOS 63   ALT 14     No results for input(s): IRON, TIBC, FERRITIN, SATURATEDIRO, [id number], FOLATE in the last 168 hours.  Recent Labs   Lab 12/26/22 0439 12/26/22  0820   TSH 0.749  --    HGBA1C  --  5.0          Microbiology Data:  Microbiology Results (last 7 days)       Procedure Component Value Units Date/Time    Blood Culture [[id number]] Collected: 12/26/22 1004    Order Status: Sent Specimen: Blood from Hand, Right Updated: 12/26/22 1019    Blood Culture [[id number]] Collected: 12/26/22 1014    Order Status: Sent Specimen: Blood from Antecubital, Left Updated: 12/26/22 1019             Other Results:    Radiology:  Imaging Results              X-Ray Chest 1 View (Final result)  Result time 12/26/22 09:36:31      Final result by Azam Peralta MD (12/26/22 09:36:31)                   Impression:      NO ACUTE CARDIOPULMONARY PROCESS IDENTIFIED.      Electronically signed by: Azam Peralta  Date:    12/26/2022  Time:    09:36               Narrative:    EXAMINATION:  XR CHEST 1 VIEW    CLINICAL HISTORY:  Dyspnea;    TECHNIQUE:  One view    COMPARISON:  None available.    FINDINGS:  Cardiopericardial silhouette is within normal limits. Lungs are without dense focal or segmental consolidation, congestive process, pleural effusions or pneumothorax.                        ED Interpretation by Patel Medina MD (12/26/22  05:25:35, [facility], Emergency Medicine)    No acute abnormality appreciated.                                  No results found in the last 24 hours.     Current Medications:     Infusions:        Scheduled:   albuterol-ipratropium        azithromycin  500 mg Intravenous Q24H    enoxaparin  40 mg Subcutaneous Daily    fluticasone furoate-vilanteroL  1 puff Inhalation Daily    predniSONE  40 mg Oral Daily         PRN:   albuterol sulfate    dextrose 10%    dextrose 10%    glucagon (human recombinant)    glucose    glucose    insulin aspart U-100    naloxone    sodium chloride 0.9%        Antibiotics and Day Number of Therapy:  Antibiotics (From admission, onward)      Start     Stop Route Frequency Ordered    12/26/22 1045  azithromycin (ZITHROMAX) 500 mg in sodium chloride 0.9% 250 mL IVPB         -- IV Every 24 hours (non-standard times) 12/26/22 0943                 Assessment & Plan:     Acute respiratory failure,   SIRS tachypnea, tachycardia, leukocytosis  - Pt got 2 rounds of breathing tx, solumedrol in ED and is on 2L O2. Failed weaning off O2 desatted to upper 80%. Admit for acute respiratory failure requiring O2. Pt also tachypneic, tachycardic with elevated WBC, neutrophil counts.   - cxr appears to have perihilar fullness, atx on R. Official read states no acute process.    -lactate 2.3, repeat in 6 hours.  -starting rocephin 1g, azithromycin to cover for CAP.  -1L bolus NS.  -Vilanterol inhaler daily.   -prn albuterol nebs q4  -pred 40 mg q5d.   -SSI.          CODE STATUS: full  Access: pIV  Antibiotics: rocephin, azithro.  Diet: regular  DVT Prophylaxis: lovenox  GI Prophylaxis: none  Fluids: 1L NS      Disposition: stable, admitted for obs of respiratory condition. Likely DC tomorrow if improvement with tx.         Alex Carrillo DO  Internal Medicine Resident PGY-I      Attending addendum to follow

## 2022-12-27 ENCOUNTER — DOCUMENTATION ONLY (OUTPATIENT)
Dept: PHARMACY | Facility: HOSPITAL | Age: 22
End: 2022-12-27
Payer: COMMERCIAL

## 2022-12-27 VITALS
RESPIRATION RATE: 18 BRPM | TEMPERATURE: 98 F | SYSTOLIC BLOOD PRESSURE: 104 MMHG | WEIGHT: 170 LBS | HEART RATE: 102 BPM | OXYGEN SATURATION: 93 % | DIASTOLIC BLOOD PRESSURE: 60 MMHG | HEIGHT: 62 IN | BODY MASS INDEX: 31.28 KG/M2

## 2022-12-27 PROBLEM — J45.901 REACTIVE AIRWAY DISEASE WITH ACUTE EXACERBATION: Status: ACTIVE | Noted: 2022-12-27

## 2022-12-27 PROBLEM — R06.02 SOB (SHORTNESS OF BREATH): Status: ACTIVE | Noted: 2022-12-27

## 2022-12-27 LAB
ALBUMIN SERPL-MCNC: 3 G/DL (ref 3.5–5)
ALBUMIN/GLOB SERPL: 0.8 RATIO (ref 1.1–2)
ALP SERPL-CCNC: 55 UNIT/L (ref 40–150)
ALT SERPL-CCNC: 12 UNIT/L (ref 0–55)
AST SERPL-CCNC: 13 UNIT/L (ref 5–34)
BASOPHILS # BLD AUTO: 0.05 X10(3)/MCL (ref 0–0.2)
BASOPHILS NFR BLD AUTO: 0.2 %
BILIRUBIN DIRECT+TOT PNL SERPL-MCNC: 0.2 MG/DL
BUN SERPL-MCNC: 14.3 MG/DL (ref 7–18.7)
CALCIUM SERPL-MCNC: 9.1 MG/DL (ref 8.4–10.2)
CHLORIDE SERPL-SCNC: 111 MMOL/L (ref 98–107)
CO2 SERPL-SCNC: 20 MMOL/L (ref 22–29)
CREAT SERPL-MCNC: 0.85 MG/DL (ref 0.55–1.02)
EOSINOPHIL # BLD AUTO: 0.06 X10(3)/MCL (ref 0–0.9)
EOSINOPHIL NFR BLD AUTO: 0.3 %
ERYTHROCYTE [DISTWIDTH] IN BLOOD BY AUTOMATED COUNT: 13.9 % (ref 11–14.5)
GFR SERPLBLD CREATININE-BSD FMLA CKD-EPI: >60 MLS/MIN/1.73/M2
GLOBULIN SER-MCNC: 3.8 GM/DL (ref 2.4–3.5)
GLUCOSE SERPL-MCNC: 96 MG/DL (ref 74–100)
HCT VFR BLD AUTO: 38.5 % (ref 37–47)
HGB BLD-MCNC: 12.4 GM/DL (ref 12–16)
IMM GRANULOCYTES # BLD AUTO: 0.17 X10(3)/MCL (ref 0–0.04)
IMM GRANULOCYTES NFR BLD AUTO: 0.8 %
LYMPHOCYTES # BLD AUTO: 2.19 X10(3)/MCL (ref 0.6–4.6)
LYMPHOCYTES NFR BLD AUTO: 10.4 %
MAGNESIUM SERPL-MCNC: 2 MG/DL (ref 1.6–2.6)
MCH RBC QN AUTO: 30.6 PG
MCHC RBC AUTO-ENTMCNC: 32.2 MG/DL (ref 33–36)
MCV RBC AUTO: 95.1 FL (ref 80–94)
MONOCYTES # BLD AUTO: 1.51 X10(3)/MCL (ref 0.1–1.3)
MONOCYTES NFR BLD AUTO: 7.2 %
NEUTROPHILS # BLD AUTO: 17.05 X10(3)/MCL (ref 2.1–9.2)
NEUTROPHILS NFR BLD AUTO: 81.1 %
NRBC BLD AUTO-RTO: 0 % (ref 0–1)
PLATELET # BLD AUTO: 277 X10(3)/MCL (ref 140–371)
PMV BLD AUTO: 10.2 FL (ref 9.4–12.4)
POCT GLUCOSE: 88 MG/DL (ref 70–110)
POCT GLUCOSE: 97 MG/DL (ref 70–110)
POTASSIUM SERPL-SCNC: 4.5 MMOL/L (ref 3.5–5.1)
PROT SERPL-MCNC: 6.8 GM/DL (ref 6.4–8.3)
RBC # BLD AUTO: 4.05 X10(6)/MCL (ref 4.2–5.4)
SODIUM SERPL-SCNC: 142 MMOL/L (ref 136–145)
WBC # SPEC AUTO: 21 X10(3)/MCL (ref 4.5–11.5)

## 2022-12-27 PROCEDURE — 94761 N-INVAS EAR/PLS OXIMETRY MLT: CPT

## 2022-12-27 PROCEDURE — 80053 COMPREHEN METABOLIC PANEL: CPT

## 2022-12-27 PROCEDURE — 85025 COMPLETE CBC W/AUTO DIFF WBC: CPT

## 2022-12-27 PROCEDURE — 36415 COLL VENOUS BLD VENIPUNCTURE: CPT

## 2022-12-27 PROCEDURE — G0378 HOSPITAL OBSERVATION PER HR: HCPCS

## 2022-12-27 PROCEDURE — 25000242 PHARM REV CODE 250 ALT 637 W/ HCPCS

## 2022-12-27 PROCEDURE — 93005 ELECTROCARDIOGRAM TRACING: CPT

## 2022-12-27 PROCEDURE — 27000221 HC OXYGEN, UP TO 24 HOURS

## 2022-12-27 PROCEDURE — 63600175 PHARM REV CODE 636 W HCPCS

## 2022-12-27 PROCEDURE — 94640 AIRWAY INHALATION TREATMENT: CPT

## 2022-12-27 PROCEDURE — 82785 ASSAY OF IGE: CPT

## 2022-12-27 PROCEDURE — 96366 THER/PROPH/DIAG IV INF ADDON: CPT

## 2022-12-27 PROCEDURE — 83735 ASSAY OF MAGNESIUM: CPT

## 2022-12-27 PROCEDURE — 25000003 PHARM REV CODE 250

## 2022-12-27 PROCEDURE — 94640 AIRWAY INHALATION TREATMENT: CPT | Mod: XB

## 2022-12-27 RX ORDER — BENZONATATE 100 MG/1
100 CAPSULE ORAL 3 TIMES DAILY PRN
Status: DISCONTINUED | OUTPATIENT
Start: 2022-12-27 | End: 2022-12-27 | Stop reason: HOSPADM

## 2022-12-27 RX ORDER — PREDNISONE 20 MG/1
40 TABLET ORAL DAILY
Qty: 6 TABLET | Refills: 0 | Status: SHIPPED | OUTPATIENT
Start: 2022-12-27 | End: 2022-12-30

## 2022-12-27 RX ORDER — ALBUTEROL SULFATE 1.25 MG/3ML
1.25 SOLUTION RESPIRATORY (INHALATION) EVERY 6 HOURS PRN
Qty: 75 ML | Refills: 0 | Status: ON HOLD | OUTPATIENT
Start: 2022-12-27 | End: 2023-01-19 | Stop reason: HOSPADM

## 2022-12-27 RX ORDER — LEVOFLOXACIN 750 MG/1
750 TABLET ORAL DAILY
Qty: 3 TABLET | Refills: 0 | Status: ON HOLD | OUTPATIENT
Start: 2022-12-27 | End: 2023-01-17

## 2022-12-27 RX ORDER — AMOXICILLIN AND CLAVULANATE POTASSIUM 875; 125 MG/1; MG/1
1 TABLET, FILM COATED ORAL EVERY 12 HOURS
Qty: 6 TABLET | Refills: 0 | Status: CANCELLED | OUTPATIENT
Start: 2022-12-27

## 2022-12-27 RX ORDER — FLUTICASONE FUROATE AND VILANTEROL 100; 25 UG/1; UG/1
1 POWDER RESPIRATORY (INHALATION) DAILY
Qty: 60 EACH | Refills: 2 | Status: SHIPPED | OUTPATIENT
Start: 2022-12-27

## 2022-12-27 RX ORDER — ALBUTEROL SULFATE 0.83 MG/ML
2.5 SOLUTION RESPIRATORY (INHALATION) EVERY 4 HOURS PRN
Qty: 3 ML | Refills: 1 | Status: SHIPPED | OUTPATIENT
Start: 2022-12-27 | End: 2023-01-26

## 2022-12-27 RX ORDER — AZITHROMYCIN 250 MG/1
250 TABLET, FILM COATED ORAL DAILY
Qty: 3 TABLET | Refills: 0 | Status: CANCELLED | OUTPATIENT
Start: 2022-12-27

## 2022-12-27 RX ADMIN — ALBUTEROL SULFATE 2.5 MG: 2.5 SOLUTION RESPIRATORY (INHALATION) at 07:12

## 2022-12-27 RX ADMIN — ALBUTEROL SULFATE 2.5 MG: 2.5 SOLUTION RESPIRATORY (INHALATION) at 10:12

## 2022-12-27 RX ADMIN — BENZONATATE 100 MG: 100 CAPSULE ORAL at 02:12

## 2022-12-27 RX ADMIN — FLUTICASONE FUROATE AND VILANTEROL TRIFENATATE 1 PUFF: 100; 25 POWDER RESPIRATORY (INHALATION) at 07:12

## 2022-12-27 RX ADMIN — ALBUTEROL SULFATE 2.5 MG: 2.5 SOLUTION RESPIRATORY (INHALATION) at 03:12

## 2022-12-27 RX ADMIN — PREDNISONE 40 MG: 20 TABLET ORAL at 09:12

## 2022-12-27 RX ADMIN — SODIUM CHLORIDE 1 G: 900 INJECTION INTRAVENOUS at 02:12

## 2022-12-27 NOTE — PROGRESS NOTES
[facility] filled prednisone, levaquin, proventil, breo-ellipta and duo-neb and delivered to bedside.

## 2022-12-27 NOTE — PROGRESS NOTES
[facility] Medicine Wards Progress Note     Resident Team: [facility] Medicine List 2  Attending Physician: Elgin Barrios MD    Date of Admit: 12/26/2022  Current Hospital Day: 2     Subjective:      Brief HPI:  Komal Catherine is a 22 y.o. female who has  has a past medical history of ADHD (attention deficit hyperactivity disorder) and Anxiety. that was admitted for acute hypoxic resp failure secondary to possible cap vs asthma exacerbation.  past medical history of ADHD (attention deficit hyperactivity disorder) and Anxiety.  She presented to [facility] ED on 12/26/2022  with a primary complaint of SOB onset 4 days ago but was worse yesterday around 0300. Pt reports checking spo2 at home in low 80% and was concerned so she came in. Associated sx include wheezing, cough, and chest tightness. Reports similar sx before when she was dancing and had COVID, seen and discharged with albuterol inhaler. No prior hospitalizations for respiratory sx. She tried her inhaler but no relief to sx. Pt states she smokes tobacco and marijuanna occasionally but not in the past 3 days. Denies any contact with others with respiratory sx. No fever, sore throat, abd pain, hemoptysis, leg pain/swelling, N/V/D, congestion, rhinorrhea.      Interval History: naeo. Pt states she is minimally wheezy, but is feeling much better and is ready to go home. Satting normal on RA.       Review of Systems:Review of Systems   Constitutional:  Negative for chills and fever.   Respiratory:  Positive for wheezing. Negative for cough and shortness of breath.    Cardiovascular:  Negative for chest pain and palpitations.   Gastrointestinal:  Negative for abdominal pain, nausea and vomiting.   Genitourinary:  Negative for flank pain and hematuria.   Skin:  Negative for itching and rash.   Neurological:  Negative for weakness and headaches.   Endo/Heme/Allergies:  Does not bruise/bleed easily.        Objective:     Vital Signs:  Vital Signs (Most Recent):  Temp: 97.4 °F  (36.3 °C) (12/27/22 0338)  Pulse: 109 (12/27/22 0338)  Resp: 18 (12/27/22 0327)  BP: 96/61 (12/27/22 0338)  SpO2: (!) 93 % (12/27/22 0338)   Vital Signs (24h Range):  Temp:  [97.4 °F (36.3 °C)-98.5 °F (36.9 °C)] 97.4 °F (36.3 °C)  Pulse:  [] 109  Resp:  [18-27] 18  SpO2:  [90 %-100 %] 93 %  BP: ()/(61-79) 96/61   Body mass index is 31.09 kg/m².     Intake/Output Summary (Last 24 hours) at 12/27/2022 0536  Last data filed at 12/26/2022 1746  Gross per 24 hour   Intake 480 ml   Output --   Net 480 ml       Physical Examination:  Vital signs and nursing notes reviewed.  General:  Well developed, well nourished, no acute distress  Head: Normocephalic, atraumatic  ENT: PERRL, MMM. No erythema to posterior OP  Neck: supple, normal ROM, no JVD  CVS:  tachycardic. Regular rhythm. S1 and S2 normal. No murmurs, rubs, or gallops. Regular peripheral pulses. No peripheral edema  Resp:  Lungs minimal  wheezing to auscultation bilaterally, much improved from yesterday. no rales, or rhonchi. Normal respiratory effort.  resting comfortably on RA.   GI:  Abdomen soft, non-tender, non-distended, normoactive bowel sounds  MSK:  Full range of motion, no obvious deformities  Skin:  No rashes, ulcers, erythema  Neuro:  Alert and oriented x3, No focal neuro deficits, CNII-XII grossly intact        Laboratory:    Recent Labs   Lab 12/27/22 0318   WBC 21.0*   HGB 12.4   HCT 38.5      MCV 95.1*   RDW 13.9     No results for input(s): TROPONINI, CKTOTAL, CKMB, BNP in the last 24 hours.  No results for input(s): TROPONINI, CKTOTAL, CKMB, BNP in the last 168 hours.  No results for input(s): CHOL, HDL, LDLCALC, TRIG, CHOLHDL in the last 168 hours. Recent Labs   Lab 12/27/22  0318   MG 2.00     No results for input(s): PROT, ALBUMIN, BILITOT, AST, ALKPHOS, ALT in the last 24 hours.  No results for input(s): IRON, TIBC, FERRITIN, SATURATEDIRO, [id number], FOLATE in the last 168 hours.  Recent Labs   Lab 12/26/22  1638  12/26/22  0820   TSH 0.749  --    HGBA1C  --  5.0          Microbiology Data:  Microbiology Results (last 7 days)       Procedure Component Value Units Date/Time    Blood Culture [[id number]] Collected: 12/26/22 1004    Order Status: Resulted Specimen: Blood from Hand, Right Updated: 12/26/22 1019    Blood Culture [[id number]] Collected: 12/26/22 1014    Order Status: Resulted Specimen: Blood from Antecubital, Left Updated: 12/26/22 1019               Radiology:    Imaging Results              X-Ray Chest 1 View (Final result)  Result time 12/26/22 09:36:31      Final result by Azam Peralta MD (12/26/22 09:36:31)                   Impression:      NO ACUTE CARDIOPULMONARY PROCESS IDENTIFIED.      Electronically signed by: Azam Peralta  Date:    12/26/2022  Time:    09:36               Narrative:    EXAMINATION:  XR CHEST 1 VIEW    CLINICAL HISTORY:  Dyspnea;    TECHNIQUE:  One view    COMPARISON:  None available.    FINDINGS:  Cardiopericardial silhouette is within normal limits. Lungs are without dense focal or segmental consolidation, congestive process, pleural effusions or pneumothorax.                        ED Interpretation by Patel Medina MD (12/26/22 05:25:35, [facility], Emergency Medicine)    No acute abnormality appreciated.                                  No results found in the last 24 hours.     Current Medications:     Infusions:        Scheduled:   azithromycin  500 mg Intravenous Q24H    cefTRIAXone (ROCEPHIN) IVPB  1 g Intravenous Q24H    enoxaparin  40 mg Subcutaneous Daily    fluticasone furoate-vilanteroL  1 puff Inhalation Daily    predniSONE  40 mg Oral Daily         PRN:   albuterol sulfate    benzonatate    dextrose 10%    dextrose 10%    glucagon (human recombinant)    glucose    glucose    insulin aspart U-100    naloxone    sodium chloride 0.9%        Antibiotics and Day Number of Therapy:  Antibiotics (From admission, onward)      Start     Stop Route  Frequency Ordered    12/27/22 0700  cefTRIAXone (ROCEPHIN) 1 g in sodium chloride 0.9 % 50 mL IVPB (MB+)         01/10 0659 IV Every 24 hours (non-standard times) 12/27/22 0145    12/26/22 1045  azithromycin (ZITHROMAX) 500 mg in sodium chloride 0.9% 250 mL IVPB         -- IV Every 24 hours (non-standard times) 12/26/22 0943                 Assessment & Plan:       Acute respiratory failure secondary to CAP vs asthma exacerbation  SIRS tachypnea, tachycardia, leukocytosis  - Pt got 2 rounds of breathing tx, solumedrol in ED and is on 2L O2. Failed weaning off O2 desatted to upper 80%. Admit for acute respiratory failure requiring O2. Pt also tachypneic, tachycardic with elevated WBC, neutrophil counts.   - cxr appears to have perihilar fullness, atx on R. Official read states no acute process.    -lactate 2.3, repeat in 6 hours.  -starting rocephin 1g, azithromycin to cover for CAP.  -1L bolus NS.  -Vilanterol inhaler daily.   -prn albuterol nebs q4  -pred 40 mg q5d.   -SSI.  -wean from 02 as tolerated.              CODE STATUS: full  Access: pIV  Antibiotics: rocephin, azithro.  Diet: regular  DVT Prophylaxis: lovenox  GI Prophylaxis: none  Fluids: 1L NS      Disposition: stable, admitted for obs of respiratory condition. Likely DC with azithro and augmentin PO for CAP. PFT outpatient.          Alex Carrillo DO  Internal Medicine Resident PGY-1        Attending addendum to follow.

## 2022-12-27 NOTE — DISCHARGE SUMMARY
"[facility] Medicine Discharge Summary    Admitting Physician: Elgin Barrios MD  Attending Physician: Elgin Barrios MD  Date of Admit: 12/26/2022  Date of Discharge: 12/27/2022    Condition: Good  Outcome: Condition has improved and patient is now ready for discharge.  DISPOSITION: Home or Self Care          Discharge Diagnoses     Patient Active Problem List   Diagnosis    Dysmenorrhea    Irregular menses    Menometrorrhagia    Reactive airway disease with acute exacerbation    SOB (shortness of breath)       Principal Problem:  Reactive airway disease with acute exacerbation    Consultants and Procedures     Consultants:  IP CONSULT TO INTERNAL MEDICINE    Procedures:   * No surgery found *     Brief Admission History      22-year-old female presents emergency room complaints of shortness of breath, wheezing that has been ongoing for last 2-3 days.  Patient reports she became concerned when using an oxygen monitor at home and noting that she was only 83%.  In triage, patient 92%.  Patient reports cough with wheezing; symptoms worse with coughing and exertion, better with rest.  Patient denies any history of asthma in the past.    Hospital Course with Pertinent Findings     Pt admitted for requiring O2 after breathing tx in ED. Breathing tx and steroids given, condition has improved. Empiric tx for CAP. Leukocytosis and neutrophils elevated although has been on steroids. Today pt states she is feeling better and is ready to go home. Negative D dimer.     Discharge physical exam:  Vitals  BP: 101/69  Temp: 97.9 °F (36.6 °C)  Temp src: Oral  Pulse: 87  Resp: 18  SpO2: 96 %  Height: 5' 2" (157.5 cm)  Weight: 77.1 kg (170 lb)    Vital signs and nursing notes reviewed.  Constitutional: Patient is in NAD. Awake and alert.    Head: Atraumatic. Normocephalic.  Eyes: Conjunctivae nl. No scleral icterus.  ENT: Mucous membranes are moist.  Airway patent  Neck: Supple. Full ROM.   Cardiovascular: Regular rate and " rhythm. No murmurs, rubs, or gallops. Distal pulses are 2+ and symmetric .  Pulmonary/Chest: No respiratory distress. Clear to auscultation bilaterally. No wheezing, rales, or rhonchi.  Abdominal: Soft. Non-distended. No TTP. No rebound, guarding, or rigidity.   Musculoskeletal: Moves all extremities. No edema.    Skin: Warm and dry.  Neurological: Awake and alert. No acute focal neurological deficits are appreciated.      TIME SPENT ON DISCHARGE: 60 minutes    Discharge Medications        Medication List        START taking these medications      * albuterol 1.25 mg/3 mL Nebu  Commonly known as: ACCUNEB  Take 3 mLs (1.25 mg total) by nebulization every 6 (six) hours as needed. Rescue     * albuterol 2.5 mg /3 mL (0.083 %) nebulizer solution  Commonly known as: PROVENTIL  Take 3 mLs (2.5 mg total) by nebulization every 4 (four) hours as needed (shortness of breath). Rescue     fluticasone furoate-vilanteroL 100-25 mcg/dose diskus inhaler  Commonly known as: BREO  Inhale 1 puff into the lungs once daily. Controller     levoFLOXacin 750 MG tablet  Commonly known as: LEVAQUIN  Take 1 tablet (750 mg total) by mouth once daily.     predniSONE 20 MG tablet  Commonly known as: DELTASONE  Take 2 tablets (40 mg total) by mouth once daily. for 3 days           * This list has 2 medication(s) that are the same as other medications prescribed for you. Read the directions carefully, and ask your doctor or other care provider to review them with you.                CONTINUE taking these medications      ADZENYS XR-ODT 12.5 mg Tblb  Generic drug: amphetamine     amitriptyline 10 MG tablet  Commonly known as: ELAVIL     citalopram 40 MG tablet  Commonly known as: CeleXA     ethynodiol-ethinyl estradiol 1-35 mg-mcg per tablet  Commonly known as: KELNOR  Take 1 tablet by mouth once daily.     FLUoxetine 10 MG capsule               Where to Get Your Medications        These medications were sent to [facility] -  [place] - [address]  [address] [facility] [address]      Phone: [phone]   albuterol 1.25 mg/3 mL Nebu  albuterol 2.5 mg /3 mL (0.083 %) nebulizer solution  fluticasone furoate-vilanteroL 100-25 mcg/dose diskus inhaler  levoFLOXacin 750 MG tablet  predniSONE 20 MG tablet         Discharge Information:     The case has been discussed with staff/attending physician. The patient has been seen and evaluated during rounds where the plan to discharge was discussed with pt. F/u with internal medicine resident clinic in post wards and to f/u with pcp. PFT ordered. Levaquin x3 days outpatinnt with pred x3 days as well. Sent home with albuterol nebs and supplies, has rescue inhaler at home RTER if any new/worsening sx. Pt/family express understanding and agree with the plan.         Alex Carrillo DO  [facility] Internal Medicine, HO-1

## 2022-12-28 LAB — IGE SERPL-ACNC: 1799 KU/L

## 2022-12-31 LAB
BACTERIA BLD CULT: NORMAL
BACTERIA BLD CULT: NORMAL

## 2023-01-17 ENCOUNTER — HOSPITAL ENCOUNTER (OUTPATIENT)
Facility: HOSPITAL | Age: 23
Discharge: HOME | End: 2023-01-19
Attending: FAMILY MEDICINE | Admitting: INTERNAL MEDICINE
Payer: COMMERCIAL

## 2023-01-17 DIAGNOSIS — J18.9 COMMUNITY ACQUIRED PNEUMONIA, UNSPECIFIED LATERALITY: Primary | ICD-10-CM

## 2023-01-17 DIAGNOSIS — R09.02 HYPOXEMIA: ICD-10-CM

## 2023-01-17 DIAGNOSIS — J45.41 MODERATE PERSISTENT REACTIVE AIRWAY DISEASE WITH ACUTE EXACERBATION: ICD-10-CM

## 2023-01-17 DIAGNOSIS — J45.42 MODERATE PERSISTENT ASTHMA WITH STATUS ASTHMATICUS: ICD-10-CM

## 2023-01-17 DIAGNOSIS — J45.901 REACTIVE AIRWAY DISEASE WITH ACUTE EXACERBATION, UNSPECIFIED ASTHMA SEVERITY, UNSPECIFIED WHETHER PERSISTENT: ICD-10-CM

## 2023-01-17 LAB
ALBUMIN SERPL-MCNC: 3.7 G/DL (ref 3.5–5)
ALBUMIN/GLOB SERPL: 0.8 RATIO (ref 1.1–2)
ALP SERPL-CCNC: 62 UNIT/L (ref 40–150)
ALT SERPL-CCNC: 13 UNIT/L (ref 0–55)
APPEARANCE UR: CLEAR
AST SERPL-CCNC: 17 UNIT/L (ref 5–34)
B-HCG UR QL: NEGATIVE
BACTERIA #/AREA URNS AUTO: ABNORMAL /HPF
BASOPHILS # BLD AUTO: 0.06 X10(3)/MCL (ref 0–0.2)
BASOPHILS NFR BLD AUTO: 0.5 %
BILIRUB UR QL STRIP.AUTO: NEGATIVE MG/DL
BILIRUBIN DIRECT+TOT PNL SERPL-MCNC: 0.6 MG/DL
BUN SERPL-MCNC: 12.7 MG/DL (ref 7–18.7)
CALCIUM SERPL-MCNC: 9.6 MG/DL (ref 8.4–10.2)
CHLORIDE SERPL-SCNC: 105 MMOL/L (ref 98–107)
CO2 SERPL-SCNC: 19 MMOL/L (ref 22–29)
COLOR UR AUTO: YELLOW
CORRECTED TEMPERATURE (PCO2): 31 MMHG (ref 35–45)
CORRECTED TEMPERATURE (PH): 7.41 (ref 7.35–7.45)
CORRECTED TEMPERATURE (PO2): 58 MMHG (ref 75–100)
CREAT SERPL-MCNC: 1.06 MG/DL (ref 0.55–1.02)
CTP QC/QA: YES
D DIMER PPP IA.FEU-MCNC: 0.31 UG/ML FEU (ref 0–0.5)
EOSINOPHIL # BLD AUTO: 1.09 X10(3)/MCL (ref 0–0.9)
EOSINOPHIL NFR BLD AUTO: 9 %
ERYTHROCYTE [DISTWIDTH] IN BLOOD BY AUTOMATED COUNT: 13.2 % (ref 11.5–17)
ETHANOL SERPL-MCNC: <10 MG/DL
FLUAV AG UPPER RESP QL IA.RAPID: NOT DETECTED
FLUBV AG UPPER RESP QL IA.RAPID: NOT DETECTED
GFR SERPLBLD CREATININE-BSD FMLA CKD-EPI: >60 MLS/MIN/1.73/M2
GLOBULIN SER-MCNC: 4.6 GM/DL (ref 2.4–3.5)
GLUCOSE SERPL-MCNC: 91 MG/DL (ref 74–100)
GLUCOSE UR QL STRIP.AUTO: NORMAL MG/DL
GRAM STN SPEC: NORMAL
HCO3 UR-SCNC: 19.6 MMOL/L (ref 22–26)
HCT VFR BLD AUTO: 48.7 % (ref 37–47)
HGB BLD-MCNC: 13.8 G/DL (ref 12–18)
HGB BLD-MCNC: 15.7 GM/DL (ref 12–16)
HYALINE CASTS #/AREA URNS LPF: ABNORMAL /LPF
IMM GRANULOCYTES # BLD AUTO: 0.03 X10(3)/MCL (ref 0–0.04)
IMM GRANULOCYTES NFR BLD AUTO: 0.2 %
KETONES UR QL STRIP.AUTO: NEGATIVE MG/DL
LACTATE SERPL-SCNC: 1.8 MMOL/L (ref 0.5–2.2)
LEUKOCYTE ESTERASE UR QL STRIP.AUTO: NEGATIVE UNIT/L
LYMPHOCYTES # BLD AUTO: 1.83 X10(3)/MCL (ref 0.6–4.6)
LYMPHOCYTES NFR BLD AUTO: 15.2 %
MCH RBC QN AUTO: 31 PG
MCHC RBC AUTO-ENTMCNC: 32.2 MG/DL (ref 33–36)
MCV RBC AUTO: 96.1 FL (ref 80–94)
MONOCYTES # BLD AUTO: 0.86 X10(3)/MCL (ref 0.1–1.3)
MONOCYTES NFR BLD AUTO: 7.1 %
MUCOUS THREADS URNS QL MICRO: ABNORMAL /LPF
NEUTROPHILS # BLD AUTO: 8.19 X10(3)/MCL (ref 2.1–9.2)
NEUTROPHILS NFR BLD AUTO: 68 %
NITRITE UR QL STRIP.AUTO: NEGATIVE
NRBC BLD AUTO-RTO: 0 %
PCO2 BLDA: 31 MMHG (ref 35–45)
PH SMN: 7.41 [PH] (ref 7.35–7.45)
PH UR STRIP.AUTO: 6 [PH]
PLATELET # BLD AUTO: 248 X10(3)/MCL (ref 130–400)
PMV BLD AUTO: 10.2 FL (ref 7.4–10.4)
PO2 BLDA: 58 MMHG (ref 75–100)
POC BASE DEFICIT: -4 MMOL/L (ref -2–2)
POC COHB: 2.1 % (ref 0.5–1.5)
POC METHB: 0.9 % (ref 0–1.5)
POC O2HB: 90.4 % (ref 94–100)
POC PERFORMED BY: ABNORMAL
POC SATURATED O2: 90 % (ref 90–100)
POC TEMPERATURE: 37 C
POTASSIUM SERPL-SCNC: 3.7 MMOL/L (ref 3.5–5.1)
PROT SERPL-MCNC: 8.3 GM/DL (ref 6.4–8.3)
PROT UR QL STRIP.AUTO: ABNORMAL MG/DL
RBC # BLD AUTO: 5.07 X10(6)/MCL (ref 4.2–5.4)
RBC #/AREA URNS AUTO: ABNORMAL /HPF
RBC UR QL AUTO: NEGATIVE UNIT/L
SALICYLATES SERPL-MCNC: <5 MG/DL
SARS-COV-2 RNA RESP QL NAA+PROBE: NOT DETECTED
SODIUM SERPL-SCNC: 137 MMOL/L (ref 136–145)
SP GR UR STRIP.AUTO: 1.03
SPECIMEN SOURCE: ABNORMAL
SQUAMOUS #/AREA URNS LPF: ABNORMAL /HPF
UROBILINOGEN UR STRIP-ACNC: NORMAL MG/DL
WBC # SPEC AUTO: 12.1 X10(3)/MCL (ref 4.5–11.5)
WBC #/AREA URNS AUTO: ABNORMAL /HPF

## 2023-01-17 PROCEDURE — 25000003 PHARM REV CODE 250

## 2023-01-17 PROCEDURE — 87070 CULTURE OTHR SPECIMN AEROBIC: CPT | Mod: 59

## 2023-01-17 PROCEDURE — 94640 AIRWAY INHALATION TREATMENT: CPT | Mod: XB

## 2023-01-17 PROCEDURE — 87205 SMEAR GRAM STAIN: CPT

## 2023-01-17 PROCEDURE — 81001 URINALYSIS AUTO W/SCOPE: CPT | Performed by: FAMILY MEDICINE

## 2023-01-17 PROCEDURE — G0378 HOSPITAL OBSERVATION PER HR: HCPCS

## 2023-01-17 PROCEDURE — 80179 DRUG ASSAY SALICYLATE: CPT

## 2023-01-17 PROCEDURE — 81025 URINE PREGNANCY TEST: CPT | Performed by: FAMILY MEDICINE

## 2023-01-17 PROCEDURE — 25500020 PHARM REV CODE 255: Performed by: INTERNAL MEDICINE

## 2023-01-17 PROCEDURE — 82803 BLOOD GASES ANY COMBINATION: CPT

## 2023-01-17 PROCEDURE — 27000221 HC OXYGEN, UP TO 24 HOURS

## 2023-01-17 PROCEDURE — 96368 THER/DIAG CONCURRENT INF: CPT

## 2023-01-17 PROCEDURE — 82077 ASSAY SPEC XCP UR&BREATH IA: CPT

## 2023-01-17 PROCEDURE — 0240U COVID/FLU A&B PCR: CPT | Performed by: FAMILY MEDICINE

## 2023-01-17 PROCEDURE — 80053 COMPREHEN METABOLIC PANEL: CPT | Performed by: FAMILY MEDICINE

## 2023-01-17 PROCEDURE — 25000242 PHARM REV CODE 250 ALT 637 W/ HCPCS: Performed by: INTERNAL MEDICINE

## 2023-01-17 PROCEDURE — 99900035 HC TECH TIME PER 15 MIN (STAT)

## 2023-01-17 PROCEDURE — 96367 TX/PROPH/DG ADDL SEQ IV INF: CPT

## 2023-01-17 PROCEDURE — 85025 COMPLETE CBC W/AUTO DIFF WBC: CPT | Performed by: FAMILY MEDICINE

## 2023-01-17 PROCEDURE — 94799 UNLISTED PULMONARY SVC/PX: CPT

## 2023-01-17 PROCEDURE — 83605 ASSAY OF LACTIC ACID: CPT | Performed by: INTERNAL MEDICINE

## 2023-01-17 PROCEDURE — 25000003 PHARM REV CODE 250: Performed by: INTERNAL MEDICINE

## 2023-01-17 PROCEDURE — 96372 THER/PROPH/DIAG INJ SC/IM: CPT | Mod: 59 | Performed by: INTERNAL MEDICINE

## 2023-01-17 PROCEDURE — 63600175 PHARM REV CODE 636 W HCPCS: Performed by: INTERNAL MEDICINE

## 2023-01-17 PROCEDURE — 63600175 PHARM REV CODE 636 W HCPCS

## 2023-01-17 PROCEDURE — 99291 CRITICAL CARE FIRST HOUR: CPT | Mod: 25

## 2023-01-17 PROCEDURE — 94640 AIRWAY INHALATION TREATMENT: CPT

## 2023-01-17 PROCEDURE — 87040 BLOOD CULTURE FOR BACTERIA: CPT | Performed by: INTERNAL MEDICINE

## 2023-01-17 PROCEDURE — 25000003 PHARM REV CODE 250: Performed by: FAMILY MEDICINE

## 2023-01-17 PROCEDURE — 36600 WITHDRAWAL OF ARTERIAL BLOOD: CPT

## 2023-01-17 PROCEDURE — 25000242 PHARM REV CODE 250 ALT 637 W/ HCPCS

## 2023-01-17 PROCEDURE — 25000242 PHARM REV CODE 250 ALT 637 W/ HCPCS: Performed by: FAMILY MEDICINE

## 2023-01-17 PROCEDURE — 63600175 PHARM REV CODE 636 W HCPCS: Performed by: FAMILY MEDICINE

## 2023-01-17 PROCEDURE — 94761 N-INVAS EAR/PLS OXIMETRY MLT: CPT

## 2023-01-17 PROCEDURE — 85379 FIBRIN DEGRADATION QUANT: CPT | Performed by: INTERNAL MEDICINE

## 2023-01-17 PROCEDURE — 96361 HYDRATE IV INFUSION ADD-ON: CPT

## 2023-01-17 PROCEDURE — 96365 THER/PROPH/DIAG IV INF INIT: CPT | Mod: 59

## 2023-01-17 RX ORDER — TERBUTALINE SULFATE 1 MG/ML
0.25 INJECTION SUBCUTANEOUS ONCE
Status: COMPLETED | OUTPATIENT
Start: 2023-01-17 | End: 2023-01-17

## 2023-01-17 RX ORDER — SODIUM CHLORIDE 0.9 % (FLUSH) 0.9 %
10 SYRINGE (ML) INJECTION
Status: DISCONTINUED | OUTPATIENT
Start: 2023-01-17 | End: 2023-01-19 | Stop reason: HOSPADM

## 2023-01-17 RX ORDER — METHYLPREDNISOLONE SOD SUCC 125 MG
60 VIAL (EA) INJECTION DAILY
Status: DISCONTINUED | OUTPATIENT
Start: 2023-01-18 | End: 2023-01-18

## 2023-01-17 RX ORDER — IPRATROPIUM BROMIDE AND ALBUTEROL SULFATE 2.5; .5 MG/3ML; MG/3ML
3 SOLUTION RESPIRATORY (INHALATION)
Status: COMPLETED | OUTPATIENT
Start: 2023-01-17 | End: 2023-01-17

## 2023-01-17 RX ORDER — MAGNESIUM SULFATE 1 G/100ML
1 INJECTION INTRAVENOUS ONCE
Status: COMPLETED | OUTPATIENT
Start: 2023-01-17 | End: 2023-01-17

## 2023-01-17 RX ORDER — FLUTICASONE FUROATE AND VILANTEROL 100; 25 UG/1; UG/1
1 POWDER RESPIRATORY (INHALATION) DAILY
Status: DISCONTINUED | OUTPATIENT
Start: 2023-01-17 | End: 2023-01-18

## 2023-01-17 RX ORDER — METHYLPREDNISOLONE SOD SUCC 125 MG
125 VIAL (EA) INJECTION
Status: COMPLETED | OUTPATIENT
Start: 2023-01-17 | End: 2023-01-17

## 2023-01-17 RX ORDER — TALC
6 POWDER (GRAM) TOPICAL NIGHTLY PRN
Status: DISCONTINUED | OUTPATIENT
Start: 2023-01-17 | End: 2023-01-19 | Stop reason: HOSPADM

## 2023-01-17 RX ORDER — IPRATROPIUM BROMIDE AND ALBUTEROL SULFATE 2.5; .5 MG/3ML; MG/3ML
3 SOLUTION RESPIRATORY (INHALATION)
Status: DISCONTINUED | OUTPATIENT
Start: 2023-01-17 | End: 2023-01-19 | Stop reason: HOSPADM

## 2023-01-17 RX ORDER — ONDANSETRON 2 MG/ML
4 INJECTION INTRAMUSCULAR; INTRAVENOUS
Status: COMPLETED | OUTPATIENT
Start: 2023-01-17 | End: 2023-01-17

## 2023-01-17 RX ORDER — FAMOTIDINE 20 MG/1
20 TABLET, FILM COATED ORAL DAILY
Status: DISCONTINUED | OUTPATIENT
Start: 2023-01-17 | End: 2023-01-19 | Stop reason: HOSPADM

## 2023-01-17 RX ORDER — ACETAMINOPHEN 325 MG/1
650 TABLET ORAL EVERY 8 HOURS PRN
Status: DISCONTINUED | OUTPATIENT
Start: 2023-01-17 | End: 2023-01-19 | Stop reason: HOSPADM

## 2023-01-17 RX ADMIN — IPRATROPIUM BROMIDE AND ALBUTEROL SULFATE 3 ML: .5; 3 SOLUTION RESPIRATORY (INHALATION) at 07:01

## 2023-01-17 RX ADMIN — SODIUM CHLORIDE 1000 ML: 9 INJECTION, SOLUTION INTRAVENOUS at 06:01

## 2023-01-17 RX ADMIN — IPRATROPIUM BROMIDE AND ALBUTEROL SULFATE 3 ML: .5; 3 SOLUTION RESPIRATORY (INHALATION) at 08:01

## 2023-01-17 RX ADMIN — SODIUM CHLORIDE, POTASSIUM CHLORIDE, SODIUM LACTATE AND CALCIUM CHLORIDE 500 ML: 600; 310; 30; 20 INJECTION, SOLUTION INTRAVENOUS at 03:01

## 2023-01-17 RX ADMIN — MAGNESIUM SULFATE IN DEXTROSE 1 G: 10 INJECTION, SOLUTION INTRAVENOUS at 08:01

## 2023-01-17 RX ADMIN — TERBUTALINE SULFATE 0.25 MG: 1 INJECTION, SOLUTION SUBCUTANEOUS at 08:01

## 2023-01-17 RX ADMIN — FAMOTIDINE 20 MG: 20 TABLET, FILM COATED ORAL at 12:01

## 2023-01-17 RX ADMIN — ONDANSETRON 4 MG: 2 INJECTION INTRAMUSCULAR; INTRAVENOUS at 06:01

## 2023-01-17 RX ADMIN — AZITHROMYCIN MONOHYDRATE 500 MG: 500 INJECTION, POWDER, LYOPHILIZED, FOR SOLUTION INTRAVENOUS at 09:01

## 2023-01-17 RX ADMIN — TERBUTALINE SULFATE 0.25 MG: 1 INJECTION, SOLUTION SUBCUTANEOUS at 07:01

## 2023-01-17 RX ADMIN — METHYLPREDNISOLONE SODIUM SUCCINATE 125 MG: 125 INJECTION, POWDER, FOR SOLUTION INTRAMUSCULAR; INTRAVENOUS at 06:01

## 2023-01-17 RX ADMIN — IOPAMIDOL 100 ML: 755 INJECTION, SOLUTION INTRAVENOUS at 10:01

## 2023-01-17 RX ADMIN — CEFTRIAXONE SODIUM 1 G: 1 INJECTION, POWDER, FOR SOLUTION INTRAMUSCULAR; INTRAVENOUS at 08:01

## 2023-01-17 RX ADMIN — IPRATROPIUM BROMIDE AND ALBUTEROL SULFATE 3 ML: .5; 3 SOLUTION RESPIRATORY (INHALATION) at 03:01

## 2023-01-17 RX ADMIN — IPRATROPIUM BROMIDE AND ALBUTEROL SULFATE 3 ML: .5; 3 SOLUTION RESPIRATORY (INHALATION) at 06:01

## 2023-01-17 NOTE — ED PROVIDER NOTES
Encounter Date: 1/17/2023       History     Chief Complaint   Patient presents with    Shortness of Breath     Patient  has  asthma.  Took  a  breathing  treatment  about  20minutes  ago    Cough     Patient is a 22-year-old female with a history asthma presents emergency room complaints of shortness of breath that has been ongoing for last 2 days.  Patient used nebulizer treatment yesterday, noticed that her oxygen saturation remained in the 80s therefore came to the emergency room for evaluation.  Patient denies fever chills.  Denies nausea or vomiting.  Reports productive yellow sputum for the last 2 days.    The history is provided by the patient.   Review of patient's allergies indicates:  No Known Allergies  Past Medical History:   Diagnosis Date    ADHD (attention deficit hyperactivity disorder)     Anxiety      Past Surgical History:   Procedure Laterality Date    NOSE SURGERY      WISDOM TOOTH EXTRACTION       Family History   Problem Relation Age of Onset    No Known Problems Mother     No Known Problems Father     Breast cancer Neg Hx     Colon cancer Neg Hx     Ovarian cancer Neg Hx      Social History     Tobacco Use    Smoking status: Never    Smokeless tobacco: Never   Substance Use Topics    Alcohol use: Yes     Comment: occasional    Drug use: Yes     Types: Marijuana     Review of Systems   Constitutional:  Negative for chills, fatigue and fever.   HENT:  Negative for ear pain, rhinorrhea and sore throat.    Eyes:  Negative for photophobia and pain.   Respiratory:  Positive for cough, shortness of breath and wheezing.    Cardiovascular:  Negative for chest pain.   Gastrointestinal:  Negative for abdominal pain, diarrhea, nausea and vomiting.   Genitourinary:  Negative for dysuria.   Neurological:  Negative for dizziness, weakness and headaches.   All other systems reviewed and are negative.    Physical Exam     Initial Vitals [01/17/23 0554]   BP Pulse Resp Temp SpO2   96/61 (!) 132 20 99 °F (37.2  °C) (!) 91 %      MAP       --         Physical Exam    Nursing note and vitals reviewed.  Constitutional: She appears well-developed and well-nourished. No distress.   HENT:   Head: Normocephalic and atraumatic.   Eyes: Conjunctivae and EOM are normal. Pupils are equal, round, and reactive to light.   Neck: Neck supple.   Normal range of motion.  Cardiovascular:  Regular rhythm and normal heart sounds.           Tachycardia   Pulmonary/Chest: No respiratory distress. She has wheezes. She has rhonchi. She has no rales.   Mild tachypnea, talking in full sentences.   Abdominal: Abdomen is soft. Bowel sounds are normal. She exhibits no distension. There is no abdominal tenderness. There is no rebound and no guarding.   Musculoskeletal:         General: Normal range of motion.      Cervical back: Normal range of motion and neck supple.     Neurological: She is alert and oriented to person, place, and time.   Skin: Skin is warm and dry. Capillary refill takes less than 2 seconds. No erythema.   Psychiatric: She has a normal mood and affect. Her behavior is normal. Judgment and thought content normal.       ED Course   Critical Care    Date/Time: 1/17/2023 10:38 AM  Performed by: Dave Dallas MD  Authorized by: Dave Dallas MD   Direct patient critical care time: 10 minutes  Additional history critical care time: 7 minutes  Ordering / reviewing critical care time: 15 minutes  Documentation critical care time: 8 minutes  Consulting other physicians critical care time: 5 minutes  Total critical care time (exclusive of procedural time) : 45 minutes  Critical care was necessary to treat or prevent imminent or life-threatening deterioration of the following conditions: respiratory failure.  Critical care was time spent personally by me on the following activities: discussions with consultants, development of treatment plan with patient or surrogate, interpretation of cardiac output measurements,  evaluation of patient's response to treatment, examination of patient, obtaining history from patient or surrogate, ordering and performing treatments and interventions, ordering and review of laboratory studies, ordering and review of radiographic studies, pulse oximetry, re-evaluation of patient's condition and review of old charts.      Labs Reviewed   COMPREHENSIVE METABOLIC PANEL - Abnormal; Notable for the following components:       Result Value    Carbon Dioxide 19 (*)     Creatinine 1.06 (*)     Globulin 4.6 (*)     Albumin/Globulin Ratio 0.8 (*)     All other components within normal limits   URINALYSIS, REFLEX TO URINE CULTURE - Abnormal; Notable for the following components:    Protein, UA Trace (*)     Bacteria, UA Trace (*)     Squamous Epithelial Cells, UA Moderate (*)     Mucous, UA Trace (*)     Hyaline Casts, UA 0-2 (*)     All other components within normal limits   CBC WITH DIFFERENTIAL - Abnormal; Notable for the following components:    WBC 12.1 (*)     Hct 48.7 (*)     MCV 96.1 (*)     MCHC 32.2 (*)     Eos # 1.09 (*)     All other components within normal limits   COVID/FLU A&B PCR - Normal    Narrative:     The Xpert Xpress SARS-CoV-2/FLU/RSV plus is a rapid, multiplexed real-time PCR test intended for the simultaneous qualitative detection and differentiation of SARS-CoV-2, Influenza A, Influenza B, and respiratory syncytial virus (RSV) viral RNA in either nasopharyngeal swab or nasal swab specimens.         D DIMER, QUANTITATIVE - Normal   LACTIC ACID, PLASMA - Normal   BLOOD CULTURE [facility]   BLOOD CULTURE [facility]   CBC W/ AUTO DIFFERENTIAL    Narrative:     The following orders were created for panel order CBC Auto Differential.  Procedure                               Abnormality         Status                     ---------                               -----------         ------                     CBC with Differential[[id number]]        Abnormal            Final result                 Please  view results for these tests on the individual orders.   EXTRA TUBES    Narrative:     The following orders were created for panel order EXTRA TUBES.  Procedure                               Abnormality         Status                     ---------                               -----------         ------                     Red Top Hold[[id number]]                                     In process                 Light Green Top Hold[[id number]]                             In process                 Lavender Top Hold[[id number]]                                In process                 Gold Top Hold[[id number]]                                    In process                 Pink Top Hold[[id number]]                                    In process                   Please view results for these tests on the individual orders.   RED TOP HOLD   LIGHT GREEN TOP HOLD   LAVENDER TOP HOLD   GOLD TOP HOLD   PINK TOP HOLD   POCT URINE PREGNANCY          Imaging Results              CTA Chest Non-Coronary (PE Studies) (Final result)  Result time 01/17/23 10:14:43      Final result by Ramón Calderon MD (01/17/23 10:14:43)                   Impression:      1. No pulmonary embolus seen.  2. Patchy right lower lobe opacities compatible with pneumonia.      Electronically signed by: Ramón Calderon  Date:    01/17/2023  Time:    10:14               Narrative:    EXAMINATION:  CTA CHEST NON CORONARY (PE STUDIES)    CLINICAL HISTORY:  Pulmonary embolism (PE) suspected, neg D-dimer;    TECHNIQUE:  Helical acquisition through the chest with IV contrast targeting the pulmonary arteries. Multiplanar and 3D MIP reconstructed images were provided for review.  mGycm. Automatic exposure control, adjustment of mA/kV or iterative reconstruction technique was used to reduce radiation.    COMPARISON:  None available.    FINDINGS:  There is some motion and streak artifact..  There is no convincing pulmonary embolus.  There is no aortic dissection.   There is no aortic dissection.    Few prominent hilar lymph nodes may be reactive.  No enlarged mediastinal or axillary lymph nodes.    Heart is normal in size. No pericardial effusion.    No pleural effusion.  There are some patchy nodular opacities in the right lower lobe.  There is no dense consolidation.    No acute findings in the imaged upper abdomen.  There are no acute osseous findings.                                       X-Ray Chest 1 View (Final result)  Result time 01/17/23 06:34:56      Final result by Ramón Calderon MD (01/17/23 06:34:56)                   Impression:      No acute findings.      Electronically signed by: Ramón Calderon  Date:    01/17/2023  Time:    06:34               Narrative:    EXAMINATION:  XR CHEST 1 VIEW    CLINICAL HISTORY:  Dyspnea;    COMPARISON:  26 December 2022    FINDINGS:  Portable frontal view of the chest was obtained. The heart is not enlarged.  Lungs are clear.  There is no pneumothorax or significant effusion.                                       Medications   albuterol-ipratropium 2.5 mg-0.5 mg/3 mL nebulizer solution 3 mL (3 mLs Nebulization Given by Other 1/17/23 0616)   methylPREDNISolone sodium succinate injection 125 mg (125 mg Intravenous Given 1/17/23 0628)   sodium chloride 0.9% bolus 1,000 mL 1,000 mL (0 mLs Intravenous Stopped 1/17/23 0728)   ondansetron injection 4 mg (4 mg Intravenous Given 1/17/23 0629)   terbutaline injection 0.25 mg (0.25 mg Subcutaneous Given 1/17/23 0738)   albuterol-ipratropium 2.5 mg-0.5 mg/3 mL nebulizer solution 3 mL (3 mLs Nebulization Given 1/17/23 0815)   terbutaline injection 0.25 mg (0.25 mg Subcutaneous Given 1/17/23 0827)   magnesium sulfate in dextrose IVPB (premix) 1 g (0 g Intravenous Stopped 1/17/23 0927)   cefTRIAXone (ROCEPHIN) 1 g in sodium chloride 0.9 % 50 mL IVPB (MB+) (0 g Intravenous Stopped 1/17/23 0910)   azithromycin (ZITHROMAX) 500 mg in sodium chloride 0.9% 250 mL IVPB (500 mg Intravenous New Bag  1/17/23 0912)   iopamidoL (ISOVUE-370) injection 100 mL (100 mLs Intravenous Given 1/17/23 1008)     Medical Decision Making:   Initial Assessment:   Symptoms likely related to a asthma exacerbation/reactive airway disease.  Patient does productive sputum for the last 2 days.  Will obtain laboratory evaluation and x-ray.  Will administer Solu-Medrol 125, and provide a DuoNeb treatment.  Will give 1 treatment to begin with as patient has used multiple neb treatments prior to arrival in the emergency room and is slightly tachycardic.  Will also administer 1 L of IV fluids.  Will continue to closely monitor.  On review of patient's medical record, patient was admitted 12/26/22 for observation due to hypoxemia and reactive airway disease.  Independently Interpreted Test(s):   I have ordered and independently interpreted X-rays - see summary below.       <> Summary of X-Ray Reading(s): Possibly left lower lobe early infiltrate.  Clinical Tests:   Lab Tests: Ordered  Radiological Study: Ordered  Additional MDM:   PERC Rule:   Age is greater than or equal to 50 = 0.0  Heart Rate is greater than or equal to 100 = 1.0  SaO2 on room air < 95% = 1.0  Unilateral leg swelling = 0.0  Hemoptysis = 0.0  Recent surgery or trauma = 0.0  Prior PE or DVT =  0.0  Hormone use = 1.0  PERC Score = 3    Well's Criteria Score:  -Clinical symptoms of DVT (leg swelling, pain with palpation) = 0.0  -Other diagnosis less likely than pulmonary embolism =            0.0  -Heart Rate >100 =   1.5  -Immobilization (= or > than 3 days) or surgery in the previous 4 weeks = 0.0  -Previous DVT/PE = 0.0  -Hemoptysis =          0.0  -Malignancy =           0.0  Well's Probability Score =    1.5            ED Course as of 01/17/23 1038   Tue Jan 17, 2023   0605 Patient has received 1 duoneb treatment - tachycardiac following treatment.  Patient currently saturating 95% on 2L of oxygen via nasal cannula and breathing 25x's/min [MW]   0649 WBC(!): 12.1 [MW]    0649 Hemoglobin: 15.7 [MW]   0649 Hematocrit(!): 48.7 [MW]   0649 Platelets: 248 [MW]   0652 Patient will be transferred to Dr. Kowalski. [MW]      ED Course User Index  [MW] Patel Medina MD          8:02 AM    After nebs, steroids and terbutaline therapy pt continue with wheezing and hypoxia. CTA ordered for pulmonary emboli due to high PERC Score. Will continue with nebs, terbutaline and magnesium.  SIRS 3/4, No focus of infection identify, this SIRS is related to asthma exacerbation; CXR with No infiltrates or pneumonic process.  Will F/U for final dispo    10:36 AM    At this time CT reveal patchy infiltrates suggestive of pneumonia. Blood cultures, lactic acid and antibiotic therapy ordered. Will admit         Clinical Impression:   Final diagnoses:  [J18.9] Community acquired pneumonia, unspecified laterality (Primary)  [J45.42] Moderate persistent asthma with status asthmaticus  [R09.02] Hypoxemia        ED Disposition Condition    Observation Stable                Dave Dallas MD  01/17/23 1038

## 2023-01-17 NOTE — H&P
"[facility] Internal Medicine History and Physical     Date of Admit: 1/17/2023    Chief Complaint     Shortness of breath x 3 days    Subjective:      History of Present Illness:  Komal Catherine is a 22 y.o. female who  has a past medical history of ADHD (attention deficit hyperactivity disorder) and Anxiety. The patient presented to [facility] ED on 1/17/2023 with a primary complaint of shortness of breath and low O2 sats (measured by device she has at home). She states that in late 2021 and early 2022 she had Covid, and since then has struggled with respiratory symptoms including shortness of breath and recurrent monthly infections (sinusitis and pneumonia). She checks her pulse ox at home and noticed this morning upon awakening that it was in the low 80s so she went to ED. She reports recent sinus trouble including congestion, cough, and production of yellow-green sputum. She denies fever/chills. She also reports chest tightness which she describes as someone sitting on her chest. She also states that she has trouble taking a deep breath and describes air hunger. When she takes a deep breath she feels that she still is not getting enough air. She smokes marijuana occasionally  (about once weekly), admits to prior history of "heavy smoking" with both marijuana and cigarettes but states that she has cut down a great deal, unable to specify exactly how many cigs/day. She has 2 cats at home, but no other animal exposure. She has had the same cats for the past 2 years and does not attribute any worsening symptoms to exposure to her cats and is unable to list any other certain exposures that make her symptoms worse. She states that she had eczema as a child but denies personal or family history of pulmonary, autoimmune, or dermatologic disease.  She was recently hospitalized for a similar complaint and was admitted for 24 hours due to suspected asthma exacerbation with pneumonia. At that point, she was prescribed Breo Ellipta but " she states that she has only been taking it PRN. She was also discharged with albuterol and has been taking it a few times per day. At that time she was also scheduled to see a pulmonologist and to complete PFTs but has not had those appts yet.    In the ED she was started on 4 L NC, was tachycardic and tachypnic, afebrile. Noted to have mild leukocytosis of 12.1. CXR was negative for acute pathology, CT chest negative for PE but showed right lower lobe opacity suspicious for pneumonia. Internal medicine consulted for admission.    Past Medical History:  Past Medical History:   Diagnosis Date    ADHD (attention deficit hyperactivity disorder)     Anxiety        Past Surgical History:  Past Surgical History:   Procedure Laterality Date    NOSE SURGERY      WISDOM TOOTH EXTRACTION     Nose surgery was for tx of deviated septum    Allergies:  Review of patient's allergies indicates:  No Known Allergies    Home Medications:  Prior to Admission medications    Medication Sig Start Date End Date Taking? Authorizing Provider   albuterol (ACCUNEB) 1.25 mg/3 mL Nebu Take 3 mLs (1.25 mg total) by nebulization every 6 (six) hours as needed. Rescue 12/27/22 12/27/23  Alex Carrillo,    albuterol (PROVENTIL) 2.5 mg /3 mL (0.083 %) nebulizer solution Take 3 mLs (2.5 mg total) by nebulization every 4 (four) hours as needed (shortness of breath). Rescue 12/27/22 1/26/23  Alex Carrillo DO   amitriptyline (ELAVIL) 10 MG tablet Take 10 mg by mouth once daily. 7/17/20   Historical Provider   amphetamine (ADZENYS XR-ODT) 12.5 mg TbLB Take by mouth.    Historical Provider   citalopram (CELEXA) 40 MG tablet Take 40 mg by mouth once daily. 8/10/20   Historical Provider   ethynodiol-ethinyl estradiol (KELNOR) 1-35 mg-mcg per tablet Take 1 tablet by mouth once daily. 2/24/21 2/24/22  Barbara Hull CNM   FLUoxetine 10 MG capsule Take 10 mg by mouth once daily.    Historical Provider   fluticasone furoate-vilanteroL (BREO) 100-25 mcg/dose  "diskus inhaler Inhale 1 puff into the lungs once daily. Controller 12/27/22   Alex Carrillo DO   levoFLOXacin (LEVAQUIN) 750 MG tablet Take 1 tablet (750 mg total) by mouth once daily. 12/27/22   Alex Carrillo DO       Family History:  Family History   Problem Relation Age of Onset    No Known Problems Mother     No Known Problems Father     Breast cancer Neg Hx     Colon cancer Neg Hx     Ovarian cancer Neg Hx    Father - asthma as child    Social History:  Social History     Tobacco Use    Smoking status: Never    Smokeless tobacco: Never   Substance Use Topics    Alcohol use: Yes     Comment: occasional    Drug use: Yes     Types: Marijuana       Review of Systems:  Review of Systems   Constitutional:  Negative for chills and fever.   HENT:  Positive for congestion and sinus pain. Negative for nosebleeds and sore throat.    Eyes:  Negative for blurred vision.   Respiratory:  Positive for cough, sputum production, shortness of breath and wheezing. Negative for hemoptysis.    Cardiovascular:  Negative for palpitations, orthopnea and leg swelling.        Chest "pressure"   Gastrointestinal:  Negative for abdominal pain, constipation, diarrhea, nausea and vomiting.   Genitourinary:  Negative for dysuria and hematuria.   Musculoskeletal:  Negative for falls, joint pain and myalgias.   Skin:  Negative for rash.   Neurological:  Negative for dizziness, weakness and headaches.   Psychiatric/Behavioral:  The patient is not nervous/anxious.             Objective:   Last 24 Hour Vital Signs:  Vitals  BP: 116/70  Temp: 98.3 °F (36.8 °C)  Temp src: Oral  Pulse: 85  Resp: 13  SpO2: 99 %  Height: 5' 2" (157.5 cm)  Weight: 77.1 kg (170 lb)    Physical Examination:  General: Patient resting comfortably, in no acute distress on 3L NC satting 95%  Eye: pupils equal, EOMI, clear conjunctiva, eyelids normal  HENT: Head-normocephalic and atraumatic, no polyps noted, no cobblestoning   Neck: full range of motion, trachea midline, " supple  Respiratory: slight expiratory wheezing noted in lower lung fields, no rales, no rhonchi  Cardiovascular: regular rate and rhythm without murmurs.  No gallops or rubs, no JVD.    Gastrointestinal: soft, non-tender, non-distended with normal bowel sounds, without masses to palpation  Genitourinary: no CVA tenderness to palpation  Musculoskeletal: full range of motion of all extremities/spine without limitation or discomfort  Integumentary: no rashes or skin lesions present  Neurologic: no signs of peripheral neurological deficit, motor/sensory function intact  Psychiatric:  alert and oriented, cognitive function intact, cooperative with exam        Laboratory:  Most Recent Data:  CMP:  Recent Labs   Lab 01/17/23  0638   CHLORIDE 105   CO2 19*   BUN 12.7   CREATININE 1.06*   GLUCOSE 91   ALBUMIN 3.7   AST 17   ALT 13   ALKPHOS 62     CBC:  Recent Labs   Lab 01/17/23  0638   WBC 12.1*   ABSNEUTRO 8.19   RBC 5.07   HGB 15.7   HCT 48.7*   MCV 96.1*   RDW 13.2     Coags: No results found for: INR, PROTIME, PTT  FLP: No results found for: CHOL, HDL, LDLCALC, TRIG, CHOLHDL  DM:   Lab Results   Component Value Date    HGBA1C 5.0 12/26/2022    CREATININE 1.06 (H) 01/17/2023     Thyroid:   Lab Results   Component Value Date    TSH 0.749 12/26/2022     Anemia: No results found for: IRON, TIBC, FERRITIN, [id number], FOLATE  Cardiac: No results found for: TROPONINI, CKTOTAL, CKMB, BNP  Urinalysis:   Lab Results   Component Value Date    COLORU YELLOW 11/12/2020    PHUA 6.0 01/17/2023    NITRITE Negative 11/12/2020    KETONESU Negative 11/12/2020    UROBILINOGEN Normal 01/17/2023    WBCUA 0-5 01/17/2023       Trended Cardiac Data:  No results for input(s): TROPONINI, CKTOTAL, CKMB, BNP in the last 168 hours.      Radiology:  Imaging Results              CTA Chest Non-Coronary (PE Studies) (Final result)  Result time 01/17/23 10:14:43      Final result by Ramón Caldreon MD (01/17/23 10:14:43)                   Impression:       1. No pulmonary embolus seen.  2. Patchy right lower lobe opacities compatible with pneumonia.      Electronically signed by: Ramón Calderon  Date:    01/17/2023  Time:    10:14               Narrative:    EXAMINATION:  CTA CHEST NON CORONARY (PE STUDIES)    CLINICAL HISTORY:  Pulmonary embolism (PE) suspected, neg D-dimer;    TECHNIQUE:  Helical acquisition through the chest with IV contrast targeting the pulmonary arteries. Multiplanar and 3D MIP reconstructed images were provided for review.  mGycm. Automatic exposure control, adjustment of mA/kV or iterative reconstruction technique was used to reduce radiation.    COMPARISON:  None available.    FINDINGS:  There is some motion and streak artifact..  There is no convincing pulmonary embolus.  There is no aortic dissection.  There is no aortic dissection.    Few prominent hilar lymph nodes may be reactive.  No enlarged mediastinal or axillary lymph nodes.    Heart is normal in size. No pericardial effusion.    No pleural effusion.  There are some patchy nodular opacities in the right lower lobe.  There is no dense consolidation.    No acute findings in the imaged upper abdomen.  There are no acute osseous findings.                                       X-Ray Chest 1 View (Final result)  Result time 01/17/23 06:34:56      Final result by Ramón Calderon MD (01/17/23 06:34:56)                   Impression:      No acute findings.      Electronically signed by: Ramón Calderon  Date:    01/17/2023  Time:    06:34               Narrative:    EXAMINATION:  XR CHEST 1 VIEW    CLINICAL HISTORY:  Dyspnea;    COMPARISON:  26 December 2022    FINDINGS:  Portable frontal view of the chest was obtained. The heart is not enlarged.  Lungs are clear.  There is no pneumothorax or significant effusion.                                           Assessment & Plan:     Suspected asthma exacerbation, possibly 2/2 medication noncompliance vs. CAP  Acute hypoxemic respiratory  failure   - Home meds include albuterol and Breo, however pt has only been taking intermittently  - On arrival O2 sat was 90% on 4L  - Flu/covid negative  - Received solumedrol 125 mg x1 in ED, terbutaline and duonebs x2  - SIRS 3/4 (tachycardia, tachypnea, leukocytosis of 12.1) however will not initiate sepsis dose fluids due to low suspicion for septic shock; heart rate and respiratory rate improved with tx and supplemental oxygen  - Wells score indicated moderate risk for PE on admission, however CT PE negative, did show RLL opacity suspicious for pneumonia  - On previous admission IgE 1799, likely would benefit from allergy/immuno follow up  - Will wean oxygen as tolerated  - Incentive spirometry  - ABG indicative of respiratory alkalosis  - Resp culture and gram stain ordered  - Started on azithromycin and rocephin for presumed CAP, low suspicion for MRSA/pseudomonas  - Solumedrol 60 mg daily  - Will continue pepcid while on steroids    Anion gap metabolic acidosis  - AG 13 on admission  - BUN within normal limits, renal indices at baseline, lactic acid normal, BG within normal limits  - salicylate level at EtOH ordered  - will continue to monitor      CODE STATUS: Full Code  Access: Peripheral  Antibiotics: rocephin and azithromycin  Diet: Regular  DVT Prophylaxis: Teds/SCDs  GI Prophylaxis: pepcid  Fluids: none    Disposition: admit to observation for acute hypoxemic resp failure requiring O2, likely asthma exacerbation with concurrent pneumonia. Continue IV abx and steroids, will wean O2 and steroids as tolerated.      Grisel Guan,   [facility]  HO-1

## 2023-01-18 PROBLEM — J45.42 MODERATE PERSISTENT ASTHMA WITH STATUS ASTHMATICUS: Status: ACTIVE | Noted: 2023-01-18

## 2023-01-18 LAB
ALBUMIN SERPL-MCNC: 2.8 G/DL (ref 3.5–5)
ALBUMIN/GLOB SERPL: 0.8 RATIO (ref 1.1–2)
ALP SERPL-CCNC: 53 UNIT/L (ref 40–150)
ALT SERPL-CCNC: 8 UNIT/L (ref 0–55)
AST SERPL-CCNC: 12 UNIT/L (ref 5–34)
BASOPHILS # BLD AUTO: 0.02 X10(3)/MCL (ref 0–0.2)
BASOPHILS NFR BLD AUTO: 0.2 %
BILIRUBIN DIRECT+TOT PNL SERPL-MCNC: 0.3 MG/DL
BUN SERPL-MCNC: 15.5 MG/DL (ref 7–18.7)
CALCIUM SERPL-MCNC: 9.2 MG/DL (ref 8.4–10.2)
CHLORIDE SERPL-SCNC: 111 MMOL/L (ref 98–107)
CO2 SERPL-SCNC: 22 MMOL/L (ref 22–29)
CREAT SERPL-MCNC: 0.78 MG/DL (ref 0.55–1.02)
EOSINOPHIL # BLD AUTO: 0.01 X10(3)/MCL (ref 0–0.9)
EOSINOPHIL NFR BLD AUTO: 0.1 %
ERYTHROCYTE [DISTWIDTH] IN BLOOD BY AUTOMATED COUNT: 13.5 % (ref 11.5–17)
GFR SERPLBLD CREATININE-BSD FMLA CKD-EPI: >60 MLS/MIN/1.73/M2
GLOBULIN SER-MCNC: 3.6 GM/DL (ref 2.4–3.5)
GLUCOSE SERPL-MCNC: 94 MG/DL (ref 74–100)
HCT VFR BLD AUTO: 37.7 % (ref 37–47)
HGB BLD-MCNC: 12.3 GM/DL (ref 12–16)
IMM GRANULOCYTES # BLD AUTO: 0.03 X10(3)/MCL (ref 0–0.04)
IMM GRANULOCYTES NFR BLD AUTO: 0.3 %
LYMPHOCYTES # BLD AUTO: 1.69 X10(3)/MCL (ref 0.6–4.6)
LYMPHOCYTES NFR BLD AUTO: 15.6 %
MAGNESIUM SERPL-MCNC: 2 MG/DL (ref 1.6–2.6)
MCH RBC QN AUTO: 31.1 PG
MCHC RBC AUTO-ENTMCNC: 32.6 MG/DL (ref 33–36)
MCV RBC AUTO: 95.4 FL (ref 80–94)
MONOCYTES # BLD AUTO: 1.18 X10(3)/MCL (ref 0.1–1.3)
MONOCYTES NFR BLD AUTO: 10.9 %
NEUTROPHILS # BLD AUTO: 7.92 X10(3)/MCL (ref 2.1–9.2)
NEUTROPHILS NFR BLD AUTO: 72.9 %
NRBC BLD AUTO-RTO: 0 %
PLATELET # BLD AUTO: 267 X10(3)/MCL (ref 130–400)
PMV BLD AUTO: 10.4 FL (ref 7.4–10.4)
POTASSIUM SERPL-SCNC: 4.8 MMOL/L (ref 3.5–5.1)
PROT SERPL-MCNC: 6.4 GM/DL (ref 6.4–8.3)
RBC # BLD AUTO: 3.95 X10(6)/MCL (ref 4.2–5.4)
SODIUM SERPL-SCNC: 142 MMOL/L (ref 136–145)
WBC # SPEC AUTO: 10.9 X10(3)/MCL (ref 4.5–11.5)

## 2023-01-18 PROCEDURE — 94761 N-INVAS EAR/PLS OXIMETRY MLT: CPT

## 2023-01-18 PROCEDURE — 27000221 HC OXYGEN, UP TO 24 HOURS

## 2023-01-18 PROCEDURE — 96376 TX/PRO/DX INJ SAME DRUG ADON: CPT

## 2023-01-18 PROCEDURE — 96366 THER/PROPH/DIAG IV INF ADDON: CPT

## 2023-01-18 PROCEDURE — 25000003 PHARM REV CODE 250

## 2023-01-18 PROCEDURE — 25000242 PHARM REV CODE 250 ALT 637 W/ HCPCS

## 2023-01-18 PROCEDURE — G0378 HOSPITAL OBSERVATION PER HR: HCPCS

## 2023-01-18 PROCEDURE — 36415 COLL VENOUS BLD VENIPUNCTURE: CPT

## 2023-01-18 PROCEDURE — 80053 COMPREHEN METABOLIC PANEL: CPT

## 2023-01-18 PROCEDURE — 63600175 PHARM REV CODE 636 W HCPCS

## 2023-01-18 PROCEDURE — 83735 ASSAY OF MAGNESIUM: CPT | Performed by: STUDENT IN AN ORGANIZED HEALTH CARE EDUCATION/TRAINING PROGRAM

## 2023-01-18 PROCEDURE — 96368 THER/DIAG CONCURRENT INF: CPT

## 2023-01-18 PROCEDURE — 85025 COMPLETE CBC W/AUTO DIFF WBC: CPT

## 2023-01-18 PROCEDURE — 94640 AIRWAY INHALATION TREATMENT: CPT

## 2023-01-18 RX ORDER — GUAIFENESIN 100 MG/5ML
200 SOLUTION ORAL EVERY 4 HOURS PRN
Status: DISCONTINUED | OUTPATIENT
Start: 2023-01-18 | End: 2023-01-19 | Stop reason: HOSPADM

## 2023-01-18 RX ORDER — MONTELUKAST SODIUM 5 MG/1
10 TABLET, CHEWABLE ORAL DAILY
Status: DISCONTINUED | OUTPATIENT
Start: 2023-01-18 | End: 2023-01-19 | Stop reason: HOSPADM

## 2023-01-18 RX ADMIN — IPRATROPIUM BROMIDE AND ALBUTEROL SULFATE 3 ML: .5; 3 SOLUTION RESPIRATORY (INHALATION) at 10:01

## 2023-01-18 RX ADMIN — IPRATROPIUM BROMIDE AND ALBUTEROL SULFATE 3 ML: .5; 3 SOLUTION RESPIRATORY (INHALATION) at 07:01

## 2023-01-18 RX ADMIN — CEFTRIAXONE SODIUM 1 G: 1 INJECTION, POWDER, FOR SOLUTION INTRAMUSCULAR; INTRAVENOUS at 09:01

## 2023-01-18 RX ADMIN — IPRATROPIUM BROMIDE AND ALBUTEROL SULFATE 3 ML: .5; 3 SOLUTION RESPIRATORY (INHALATION) at 02:01

## 2023-01-18 RX ADMIN — METHYLPREDNISOLONE SODIUM SUCCINATE 60 MG: 40 INJECTION, POWDER, FOR SOLUTION INTRAMUSCULAR; INTRAVENOUS at 08:01

## 2023-01-18 RX ADMIN — FAMOTIDINE 20 MG: 20 TABLET, FILM COATED ORAL at 08:01

## 2023-01-18 RX ADMIN — MONTELUKAST SODIUM 10 MG: 5 TABLET, CHEWABLE ORAL at 11:01

## 2023-01-18 RX ADMIN — AZITHROMYCIN MONOHYDRATE 500 MG: 500 INJECTION, POWDER, LYOPHILIZED, FOR SOLUTION INTRAVENOUS at 08:01

## 2023-01-18 RX ADMIN — METHYLPREDNISOLONE SODIUM SUCCINATE 60 MG: 125 INJECTION, POWDER, FOR SOLUTION INTRAMUSCULAR; INTRAVENOUS at 08:01

## 2023-01-18 RX ADMIN — GUAIFENESIN 200 MG: 100 SOLUTION ORAL at 11:01

## 2023-01-18 RX ADMIN — IPRATROPIUM BROMIDE AND ALBUTEROL SULFATE 3 ML: .5; 3 SOLUTION RESPIRATORY (INHALATION) at 11:01

## 2023-01-18 NOTE — PROGRESS NOTES
"[facility] Medicine Wards   Progress Note     Resident Team: [facility] Medicine List 3  Attending Physician: Tim Adair MD  Resident: Yanick Bland  Intern: Karla Guan Carbajal     Subjective:      Brief HPI:  Komal Catherine is a 22 y.o. female who  has a past medical history of ADHD (attention deficit hyperactivity disorder) and Anxiety. The patient presented to [facility] ED on 1/17/2023 with a primary complaint of shortness of breath and low O2 sats (measured by device she has at home). She states that in late 2021 and early 2022 she had Covid, and since then has struggled with respiratory symptoms including shortness of breath and recurrent monthly infections (sinusitis and pneumonia). She checks her pulse ox at home and noticed this morning upon awakening that it was in the low 80s so she went to ED. She reports recent sinus trouble including congestion, cough, and production of yellow-green sputum. She denies fever/chills. She also reports chest tightness which she describes as someone sitting on her chest. She also states that she has trouble taking a deep breath and describes air hunger. When she takes a deep breath she feels that she still is not getting enough air. She smokes marijuana occasionally  (about once weekly), admits to prior history of "heavy smoking" with both marijuana and cigarettes but states that she has cut down a great deal, unable to specify exactly how many cigs/day. She has 2 cats at home, but no other animal exposure. She has had the same cats for the past 2 years and does not attribute any worsening symptoms to exposure to her cats and is unable to list any other certain exposures that make her symptoms worse. She states that she had eczema as a child but denies personal or family history of pulmonary, autoimmune, or dermatologic disease.  She was recently hospitalized for a similar complaint and was admitted for 24 hours due to suspected asthma exacerbation with pneumonia. At that " point, she was prescribed Breo Ellipta but she states that she has only been taking it PRN. She was also discharged with albuterol and has been taking it a few times per day. At that time she was also scheduled to see a pulmonologist and to complete PFTs but has not had those appts yet.     In the ED she was started on 4 L NC, was tachycardic and tachypnic, afebrile. Noted to have mild leukocytosis of 12.1. CXR was negative for acute pathology, CT chest negative for PE but showed right lower lobe opacity suspicious for pneumonia. Internal medicine consulted for admission.    Interval History: Patient remained on 3 L all night, maintaining O2 sats in the mid-upper 90s. She states that she feels better and less short of breath than when she came in. She was able to rest well and had no acute events overnight. Leukocytosis resolved. No complaints or concerns this morning.      Review of Systems:  Pertinent ROS negative unless otherwise stated above.       Objective:     Vital Signs (Most Recent):  Temp: 98.2 °F (36.8 °C) (01/18/23 0439)  Pulse: 101 (01/18/23 0439)  Resp: 20 (01/18/23 0439)  BP: 99/62 (01/18/23 0439)  SpO2: 95 % (01/18/23 0439) Vital Signs (24h Range):  Temp:  [97.9 °F (36.6 °C)-98.8 °F (37.1 °C)] 98.2 °F (36.8 °C)  Pulse:  [] 101  Resp:  [13-29] 20  SpO2:  [91 %-99 %] 95 %  BP: ()/(62-75) 99/62       Physical Examination:  General: Patient resting comfortably, in no acute distress, on 3L satting 97%  Eye: pupils equal, EOMI, clear conjunctiva, eyelids normal  HENT: Head-normocephalic and atraumatic  Neck: full range of motion, no thyromegaly or lymphadenopathy, trachea midline, supple  Respiratory: clear to auscultation bilaterally without wheezes, rales, rhonchi  Cardiovascular: regular rate and rhythm without murmurs.  No gallops or rubs, no JVD.  Capillary refill within normal limits.  Gastrointestinal: soft, non-tender, non-distended with normal bowel sounds, without masses to  palpation  Genitourinary: no CVA tenderness to palpation  Musculoskeletal: full range of motion of all extremities/spine without limitation or discomfort  Integumentary: no rashes or skin lesions present  Neurologic: no signs of peripheral neurological deficit, motor/sensory function intact  Psychiatric:  alert and oriented, cognitive function intact, cooperative with exam      Laboratory:  Most Recent Data:  Recent Lab Results  (Last 5 results in the past 24 hours)        01/18/23  0316   01/17/23  1206   01/17/23  0830   01/17/23  0830   01/17/23  0820        Base Deficit       -4.0  Comment: Result is outside patient normal (reference) range.         Performed By:       DV         Correct Temperature (PH)       7.41         Corrected Temperature (pCO2)       31  Comment: Result is outside patient normal (reference) range.         Corrected Temperature (pO2)       58  Comment: Result is outside patient normal (reference) range.         POC COHb       2.1  Comment: Result is outside patient normal (reference) range.         POC MetHb       0.90         POC O2Hb       90.4  Comment: Result is outside patient normal (reference) range.         Specimen source       Arterial sample         Albumin/Globulin Ratio 0.8               Albumin 2.8               Alkaline Phosphatase 53               ALT 8               AST 12               Baso # 0.02               Basophil % 0.2               BILIRUBIN TOTAL 0.3               BUN 15.5               Calcium 9.2               Chloride 111               CO2 22               Creatinine 0.78               eGFR >60               Eos # 0.01               Eosinophil % 0.1               Globulin, Total 3.6               Glucose 94               Gram Stain Result   Quality 1+                Many Gram Positive Rods                Many Gram positive cocci                Many Gram Negative Rods             Hematocrit 37.7               Hemoglobin 12.3               Immature Grans (Abs)  0.03               Immature Granulocytes 0.3               Lactate, Logan         1.8       Lymph # 1.69               LYMPH % 15.6               MCH 31.1               MCHC 32.6               MCV 95.4               Mono # 1.18               Mono % 10.9               MPV 10.4               Neut # 7.92               Neut % 72.9               nRBC 0.0               Platelets 267               POC HCO3       19.6  Comment: Result is outside patient normal (reference) range.         POC HEMOGLOBIN       13.8         POC PCO2       31  Comment: Result is outside patient normal (reference) range.         POC PH       7.41         POC PO2       58  Comment: Result is outside patient normal (reference) range.         POC SATURATED O2       90.0         POC Temp       37.0         Potassium 4.8               Preg Test, Ur     Negative           PROTEIN TOTAL 6.4                Acceptable     Yes           RBC 3.95               RDW 13.5               Sodium 142               WBC 10.9                                        Microbiology Data Reviewed: yes  Pertinent Findings:  Blood and respiratory cultures pending  Gram stain of sputum many gram + rods and gram + cocci, gram neg rods      Radiology:  No new imaging      Current Medications:     Infusions:       Scheduled:   albuterol-ipratropium  3 mL Nebulization Q4H WAKE    azithromycin  500 mg Intravenous Q24H    cefTRIAXone (ROCEPHIN) IVPB  1 g Intravenous Q24H    famotidine  20 mg Oral Daily    methylPREDNISolone sodium succinate injection  60 mg Intravenous Daily        PRN:  acetaminophen, melatonin, sodium chloride 0.9%    Antibiotics and Day Number of Therapy:  Azithromycin and rocephin initiated 1/17      Intake/Output Summary (Last 24 hours) at 1/18/2023 0645  Last data filed at 1/17/2023 2100  Gross per 24 hour   Intake 1689 ml   Output 700 ml   Net 989 ml       Lines/Drains/Airways       Peripheral Intravenous Line  Duration                   Peripheral IV - Single Lumen 01/17/23 0626 20 G Anterior;Distal;Left Forearm 1 day                      Assessment & Plan:     Suspected asthma exacerbation, possibly 2/2 medication noncompliance vs. CAP  Acute hypoxemic respiratory failure   - Home meds include albuterol and Breo, however pt has only been taking intermittently  - On arrival O2 sat was 90% on 4L  - Flu/covid negative  - Received solumedrol 125 mg x1 in ED, terbutaline and duonebs x2  - SIRS 3/4 (tachycardia, tachypnea, leukocytosis of 12.1) however will not initiate sepsis dose fluids due to low suspicion for septic shock; heart rate and respiratory rate improved with tx and supplemental oxygen  - Wells score indicated moderate risk for PE on admission, however CT PE negative, did show RLL opacity suspicious for pneumonia  - On previous admission IgE 1799, likely would benefit from allergy/immuno follow up  - Will wean oxygen as tolerated  - Incentive spirometry  - ABG indicative of respiratory alkalosis  - Resp culture and gram stain ordered  - Started on azithromycin and rocephin for presumed CAP, low suspicion for MRSA/pseudomonas  - Solumedrol 60 mg daily  - Will continue pepcid while on steroids     Anion gap metabolic acidosis, resolved  - AG 13 on admission, resolved on day 2  - BUN within normal limits, renal indices at baseline, lactic acid normal, BG within normal limits  - salicylate level and EtOH negative  - will continue to monitor with daily labs      CODE STATUS: Full Code  Access: Peripheral  Antibiotics: rocephin, azithro  Diet: Regular  DVT Prophylaxis: Teds/SCDs  GI Prophylaxis: pepcid  Fluids: none    Disposition: still requiring supplemental oxygen, will try to wean as tolerated today, on IV steroids and abx        Grisel Guan DO  [facility]  HO-1

## 2023-01-18 NOTE — MEDICAL/APP STUDENT
"[facility] Medicine Wards   Progress Note     Resident Team: [facility] Medicine List 3  Attending Physician: Tim Adair MD  Resident:   Intern: Freida     Subjective:      Brief HPI:  Komal Catherine is a 22 y.o. female who  has a past medical history of ADHD (attention deficit hyperactivity disorder) and Anxiety. The patient presented to [facility] ED on 1/17/2023 with a primary complaint of shortness of breath and low O2 sats (measured by device she has at home). She states that in late 2021 and early 2022 she had Covid, and since then has struggled with respiratory symptoms including shortness of breath and recurrent monthly infections (sinusitis and pneumonia). She checks her pulse ox at home and noticed this morning upon awakening that it was in the low 80s so she went to ED. She reports recent sinus trouble including congestion, cough, and production of yellow-green sputum. She denies fever/chills. She also reports chest tightness which she describes as someone sitting on her chest. She also states that she has trouble taking a deep breath and describes air hunger. When she takes a deep breath she feels that she still is not getting enough air. She smokes marijuana occasionally  (about once weekly), admits to prior history of "heavy smoking" with both marijuana and cigarettes but states that she has cut down a great deal, unable to specify exactly how many cigs/day. She has 2 cats at home, but no other animal exposure. She has had the same cats for the past 2 years and does not attribute any worsening symptoms to exposure to her cats and is unable to list any other certain exposures that make her symptoms worse. She states that she had eczema as a child but denies personal or family history of pulmonary, autoimmune, or dermatologic disease.  She was recently hospitalized for a similar complaint and was admitted for 24 hours due to suspected asthma exacerbation with pneumonia. At that point, she was prescribed Breo " Ellipta but she states that she has only been taking it PRN. She was also discharged with albuterol and has been taking it a few times per day. At that time she was also scheduled to see a pulmonologist and to complete PFTs but has not had those appts yet.     In the ED she was started on 4 L NC, was tachycardic and tachypnic, afebrile. Noted to have mild leukocytosis of 12.1. CXR was negative for acute pathology, CT chest negative for PE but showed right lower lobe opacity suspicious for pneumonia. Internal medicine consulted for admission.    Interval History:   No acute events overnight. Pt has been sating in the upper 90s on room air this morning. Still coughing with mucus sputum. Congestion but denies any SOB.       Review of Systems:  Pertinent ROS negative unless otherwise stated above.       Objective:     Vital Signs (Most Recent):  Temp: 98.2 °F (36.8 °C) (01/18/23 0439)  Pulse: 101 (01/18/23 0439)  Resp: 20 (01/18/23 0439)  BP: 99/62 (01/18/23 0439)  SpO2: 95 % (01/18/23 0439) Vital Signs (24h Range):  Temp:  [97.9 °F (36.6 °C)-98.8 °F (37.1 °C)] 98.2 °F (36.8 °C)  Pulse:  [] 101  Resp:  [13-29] 20  SpO2:  [91 %-99 %] 95 %  BP: ()/(62-75) 99/62       Physical Examination:  General: Patient resting comfortably, in no acute distress, on RA  Eye: pupils equal, EOMI, clear conjunctiva, eyelids normal  HENT: Head-normocephalic and atraumatic, no polyps noted, no cobblestoning   Neck: full range of motion, trachea midline, supple  Respiratory: slight expiratory wheezing noted in lower lung fields, no rales, no rhonchi  Cardiovascular: regular rate and rhythm without murmurs.  No gallops or rubs, no JVD.    Gastrointestinal: soft, non-tender, non-distended with normal bowel sounds, without masses to palpation  Genitourinary: no CVA tenderness to palpation  Musculoskeletal: full range of motion of all extremities/spine without limitation or discomfort  Integumentary: no rashes or skin lesions  present  Neurologic: no signs of peripheral neurological deficit, motor/sensory function intact  Psychiatric:  alert and oriented, cognitive function intact, cooperative with exam      Laboratory:  Most Recent Data:  CBC:   Recent Labs   Lab 01/17/23  0638 01/18/23  0316   WBC 12.1* 10.9   HGB 15.7 12.3   HCT 48.7* 37.7    267     CMP:   Recent Labs   Lab 01/18/23  0316   CALCIUM 9.2   ALBUMIN 2.8*      K 4.8   CO2 22   BUN 15.5   CREATININE 0.78   ALKPHOS 53   ALT 8   AST 12   BILITOT 0.3         Microbiology Data Reviewed: yes  Pertinent Findings:  Blood Culture #1 and #2 (1/17): pending   Respiratory Culture (1/17): pending  Gram stain sputum (1/17): gram pos rods, gram pos cocci, gram neg rods    Other Results:    Radiology:  No new imaging     Imaging Results              CTA Chest Non-Coronary (PE Studies) (Final result)  Result time 01/17/23 10:14:43      Final result by Ramón Calderon MD (01/17/23 10:14:43)                   Impression:      1. No pulmonary embolus seen.  2. Patchy right lower lobe opacities compatible with pneumonia.      Electronically signed by: Ramón Calderon  Date:    01/17/2023  Time:    10:14               Narrative:    EXAMINATION:  CTA CHEST NON CORONARY (PE STUDIES)    CLINICAL HISTORY:  Pulmonary embolism (PE) suspected, neg D-dimer;    TECHNIQUE:  Helical acquisition through the chest with IV contrast targeting the pulmonary arteries. Multiplanar and 3D MIP reconstructed images were provided for review.  mGycm. Automatic exposure control, adjustment of mA/kV or iterative reconstruction technique was used to reduce radiation.    COMPARISON:  None available.    FINDINGS:  There is some motion and streak artifact..  There is no convincing pulmonary embolus.  There is no aortic dissection.  There is no aortic dissection.    Few prominent hilar lymph nodes may be reactive.  No enlarged mediastinal or axillary lymph nodes.    Heart is normal in size. No pericardial  effusion.    No pleural effusion.  There are some patchy nodular opacities in the right lower lobe.  There is no dense consolidation.    No acute findings in the imaged upper abdomen.  There are no acute osseous findings.                                       X-Ray Chest 1 View (Final result)  Result time 01/17/23 06:34:56      Final result by Ramón Calderon MD (01/17/23 06:34:56)                   Impression:      No acute findings.      Electronically signed by: Ramón Calderon  Date:    01/17/2023  Time:    06:34               Narrative:    EXAMINATION:  XR CHEST 1 VIEW    CLINICAL HISTORY:  Dyspnea;    COMPARISON:  26 December 2022    FINDINGS:  Portable frontal view of the chest was obtained. The heart is not enlarged.  Lungs are clear.  There is no pneumothorax or significant effusion.                                      Current Medications:     Infusions:       Scheduled:   albuterol-ipratropium  3 mL Nebulization Q4H WAKE    azithromycin  500 mg Intravenous Q24H    cefTRIAXone (ROCEPHIN) IVPB  1 g Intravenous Q24H    famotidine  20 mg Oral Daily    methylPREDNISolone sodium succinate injection  60 mg Intravenous Daily        PRN:  acetaminophen, melatonin, sodium chloride 0.9%    Antibiotics and Day Number of Therapy:  Azithromycin - initiated 1/17  Ceftriaxone - initiated 1/17      Intake/Output Summary (Last 24 hours) at 1/18/2023 0632  Last data filed at 1/17/2023 2100  Gross per 24 hour   Intake 1689 ml   Output 700 ml   Net 989 ml       Lines/Drains/Airways       Peripheral Intravenous Line  Duration                  Peripheral IV - Single Lumen 01/17/23 0626 20 G Anterior;Distal;Left Forearm 1 day                      Assessment & Plan:   Suspected asthma exacerbation, possibly 2/2 medication noncompliance vs. CAP  Acute hypoxemic respiratory failure   - Home meds include albuterol and Breo, however pt has only been taking intermittently  - On arrival O2 sat was 90% on 4L  - Flu/covid negative  -  Received solumedrol 125 mg x1 in ED, terbutaline and duonebs x2  - SIRS 3/4 (tachycardia, tachypnea, leukocytosis of 12.1) however will not initiate sepsis dose fluids due to low suspicion for septic shock; heart rate and respiratory rate improved with tx and supplemental oxygen  - Wells score indicated moderate risk for PE on admission, however CT PE negative, did show RLL opacity suspicious for pneumonia  - On previous admission IgE 1799, likely would benefit from allergy/immuno follow up  - Will wean oxygen as tolerated  - Incentive spirometry  - ABG indicative of respiratory alkalosis  - Resp culture and gram stain ordered  - Started on azithromycin and rocephin for presumed CAP, low suspicion for MRSA/pseudomonas  - Solumedrol 60 mg daily  - Will continue pepcid while on steroids     Anion gap metabolic acidosis  - AG 13 on admission  - BUN within normal limits, renal indices at baseline, lactic acid normal, BG within normal limits  - salicylate level at EtOH ordered  - will continue to monitor        CODE STATUS: Full Code  Access: Peripheral  Antibiotics: rocephin and azithromycin  Diet: Regular  DVT Prophylaxis: Teds/SCDs  GI Prophylaxis: pepcid  Fluids: none     Disposition: wean to RA today, sating in the high 90s, on IV steroids and antibiotics        Tram De Los Santos  MS3 [facility]

## 2023-01-19 VITALS
SYSTOLIC BLOOD PRESSURE: 110 MMHG | BODY MASS INDEX: 31.04 KG/M2 | HEART RATE: 103 BPM | TEMPERATURE: 98 F | RESPIRATION RATE: 18 BRPM | HEIGHT: 62 IN | DIASTOLIC BLOOD PRESSURE: 74 MMHG | WEIGHT: 168.69 LBS | OXYGEN SATURATION: 91 %

## 2023-01-19 LAB
ALBUMIN SERPL-MCNC: 3 G/DL (ref 3.5–5)
ALBUMIN/GLOB SERPL: 0.8 RATIO (ref 1.1–2)
ALP SERPL-CCNC: 52 UNIT/L (ref 40–150)
ALT SERPL-CCNC: 10 UNIT/L (ref 0–55)
AST SERPL-CCNC: 12 UNIT/L (ref 5–34)
BACTERIA SPEC CULT: NORMAL
BASOPHILS # BLD AUTO: 0.01 X10(3)/MCL (ref 0–0.2)
BASOPHILS NFR BLD AUTO: 0.1 %
BILIRUBIN DIRECT+TOT PNL SERPL-MCNC: 0.2 MG/DL
BUN SERPL-MCNC: 15 MG/DL (ref 7–18.7)
CALCIUM SERPL-MCNC: 9.5 MG/DL (ref 8.4–10.2)
CHLORIDE SERPL-SCNC: 109 MMOL/L (ref 98–107)
CO2 SERPL-SCNC: 22 MMOL/L (ref 22–29)
CREAT SERPL-MCNC: 0.8 MG/DL (ref 0.55–1.02)
EOSINOPHIL # BLD AUTO: 0 X10(3)/MCL (ref 0–0.9)
EOSINOPHIL NFR BLD AUTO: 0 %
ERYTHROCYTE [DISTWIDTH] IN BLOOD BY AUTOMATED COUNT: 13.2 % (ref 11.5–17)
GFR SERPLBLD CREATININE-BSD FMLA CKD-EPI: >60 MLS/MIN/1.73/M2
GLOBULIN SER-MCNC: 3.8 GM/DL (ref 2.4–3.5)
GLUCOSE SERPL-MCNC: 132 MG/DL (ref 74–100)
HCT VFR BLD AUTO: 38.1 % (ref 37–47)
HGB BLD-MCNC: 12.3 GM/DL (ref 12–16)
IMM GRANULOCYTES # BLD AUTO: 0.05 X10(3)/MCL (ref 0–0.04)
IMM GRANULOCYTES NFR BLD AUTO: 0.6 %
LYMPHOCYTES # BLD AUTO: 0.65 X10(3)/MCL (ref 0.6–4.6)
LYMPHOCYTES NFR BLD AUTO: 7.2 %
MCH RBC QN AUTO: 30.4 PG
MCHC RBC AUTO-ENTMCNC: 32.3 MG/DL (ref 33–36)
MCV RBC AUTO: 94.3 FL (ref 80–94)
MONOCYTES # BLD AUTO: 0.28 X10(3)/MCL (ref 0.1–1.3)
MONOCYTES NFR BLD AUTO: 3.1 %
NEUTROPHILS # BLD AUTO: 8.02 X10(3)/MCL (ref 2.1–9.2)
NEUTROPHILS NFR BLD AUTO: 89 %
NRBC BLD AUTO-RTO: 0 %
PLATELET # BLD AUTO: 286 X10(3)/MCL (ref 130–400)
PMV BLD AUTO: 10.3 FL (ref 7.4–10.4)
POTASSIUM SERPL-SCNC: 4.5 MMOL/L (ref 3.5–5.1)
PROT SERPL-MCNC: 6.8 GM/DL (ref 6.4–8.3)
RBC # BLD AUTO: 4.04 X10(6)/MCL (ref 4.2–5.4)
SODIUM SERPL-SCNC: 139 MMOL/L (ref 136–145)
WBC # SPEC AUTO: 9 X10(3)/MCL (ref 4.5–11.5)

## 2023-01-19 PROCEDURE — 63600175 PHARM REV CODE 636 W HCPCS

## 2023-01-19 PROCEDURE — 25000003 PHARM REV CODE 250

## 2023-01-19 PROCEDURE — 94640 AIRWAY INHALATION TREATMENT: CPT

## 2023-01-19 PROCEDURE — 85025 COMPLETE CBC W/AUTO DIFF WBC: CPT

## 2023-01-19 PROCEDURE — 27000221 HC OXYGEN, UP TO 24 HOURS

## 2023-01-19 PROCEDURE — 80053 COMPREHEN METABOLIC PANEL: CPT

## 2023-01-19 PROCEDURE — 25000242 PHARM REV CODE 250 ALT 637 W/ HCPCS

## 2023-01-19 PROCEDURE — G0378 HOSPITAL OBSERVATION PER HR: HCPCS

## 2023-01-19 PROCEDURE — 96366 THER/PROPH/DIAG IV INF ADDON: CPT

## 2023-01-19 PROCEDURE — 96376 TX/PRO/DX INJ SAME DRUG ADON: CPT

## 2023-01-19 PROCEDURE — 36415 COLL VENOUS BLD VENIPUNCTURE: CPT

## 2023-01-19 PROCEDURE — 94761 N-INVAS EAR/PLS OXIMETRY MLT: CPT

## 2023-01-19 RX ORDER — PREDNISONE 10 MG/1
TABLET ORAL
Qty: 14 TABLET | Refills: 0 | Status: SHIPPED | OUTPATIENT
Start: 2023-01-19 | End: 2023-01-24

## 2023-01-19 RX ORDER — MONTELUKAST SODIUM 5 MG/1
10 TABLET, CHEWABLE ORAL DAILY
Qty: 60 TABLET | Refills: 0 | Status: SHIPPED | OUTPATIENT
Start: 2023-01-20 | End: 2023-02-19

## 2023-01-19 RX ORDER — LEVOFLOXACIN 750 MG/1
750 TABLET ORAL DAILY
Qty: 3 TABLET | Refills: 0 | Status: SHIPPED | OUTPATIENT
Start: 2023-01-19 | End: 2023-01-22

## 2023-01-19 RX ORDER — FAMOTIDINE 20 MG/1
20 TABLET, FILM COATED ORAL DAILY
Qty: 30 TABLET | Refills: 11 | Status: SHIPPED | OUTPATIENT
Start: 2023-01-20 | End: 2024-01-20

## 2023-01-19 RX ORDER — GUAIFENESIN 100 MG/5ML
200 SOLUTION ORAL EVERY 4 HOURS PRN
Qty: 180 ML | Refills: 0 | Status: SHIPPED | OUTPATIENT
Start: 2023-01-19 | End: 2023-01-29

## 2023-01-19 RX ADMIN — FAMOTIDINE 20 MG: 20 TABLET, FILM COATED ORAL at 08:01

## 2023-01-19 RX ADMIN — MONTELUKAST SODIUM 10 MG: 5 TABLET, CHEWABLE ORAL at 08:01

## 2023-01-19 RX ADMIN — AZITHROMYCIN MONOHYDRATE 500 MG: 500 INJECTION, POWDER, LYOPHILIZED, FOR SOLUTION INTRAVENOUS at 09:01

## 2023-01-19 RX ADMIN — IPRATROPIUM BROMIDE AND ALBUTEROL SULFATE 3 ML: .5; 3 SOLUTION RESPIRATORY (INHALATION) at 11:01

## 2023-01-19 RX ADMIN — IPRATROPIUM BROMIDE AND ALBUTEROL SULFATE 3 ML: .5; 3 SOLUTION RESPIRATORY (INHALATION) at 07:01

## 2023-01-19 RX ADMIN — METHYLPREDNISOLONE SODIUM SUCCINATE 60 MG: 40 INJECTION, POWDER, FOR SOLUTION INTRAMUSCULAR; INTRAVENOUS at 08:01

## 2023-01-19 RX ADMIN — CEFTRIAXONE SODIUM 1 G: 1 INJECTION, POWDER, FOR SOLUTION INTRAMUSCULAR; INTRAVENOUS at 08:01

## 2023-01-19 NOTE — PROGRESS NOTES
"[facility] Medicine Wards   Progress Note     Resident Team: [facility] Medicine List 3  Attending Physician: Tim Adair MD  Resident: Yanick Bland  Intern: Karla Guan Carbajal     Subjective:      Brief HPI:  Komal Catherine is a 22 y.o. female who  has a past medical history of ADHD (attention deficit hyperactivity disorder) and Anxiety. The patient presented to [facility] ED on 1/17/2023 with a primary complaint of shortness of breath and low O2 sats (measured by device she has at home). She states that in late 2021 and early 2022 she had Covid, and since then has struggled with respiratory symptoms including shortness of breath and recurrent monthly infections (sinusitis and pneumonia). She checks her pulse ox at home and noticed this morning upon awakening that it was in the low 80s so she went to ED. She reports recent sinus trouble including congestion, cough, and production of yellow-green sputum. She denies fever/chills. She also reports chest tightness which she describes as someone sitting on her chest. She also states that she has trouble taking a deep breath and describes air hunger. When she takes a deep breath she feels that she still is not getting enough air. She smokes marijuana occasionally  (about once weekly), admits to prior history of "heavy smoking" with both marijuana and cigarettes but states that she has cut down a great deal, unable to specify exactly how many cigs/day. She has 2 cats at home, but no other animal exposure. She has had the same cats for the past 2 years and does not attribute any worsening symptoms to exposure to her cats and is unable to list any other certain exposures that make her symptoms worse. She states that she had eczema as a child but denies personal or family history of pulmonary, autoimmune, or dermatologic disease.  She was recently hospitalized for a similar complaint and was admitted for 24 hours due to suspected asthma exacerbation with pneumonia. At that " point, she was prescribed Breo Ellipta but she states that she has only been taking it PRN. She was also discharged with albuterol and has been taking it a few times per day. At that time she was also scheduled to see a pulmonologist and to complete PFTs but has not had those appts yet.     In the ED she was started on 4 L NC, was tachycardic and tachypnic, afebrile. Noted to have mild leukocytosis of 12.1. CXR was negative for acute pathology, CT chest negative for PE but showed right lower lobe opacity suspicious for pneumonia. Internal medicine consulted for admission.    Interval History: Patient remained on 2L O2 NC throughout the night, six minute walk not attempted yesterday due to drop to 89% on RA yesterday, will attempt again later today. Patient states wheezing has significantly improved and denies dyspnea. Only concern is being anxious about possibility of her O2 sat dropping without O2 supplementation but otherwise no complaints, hemodynamically stable, labs stable.      Review of Systems:  Pertinent ROS negative unless otherwise stated above.       Objective:     Vital Signs (Most Recent):  Temp: 98 °F (36.7 °C) (01/19/23 0742)  Pulse: 84 (01/19/23 0742)  Resp: 20 (01/19/23 0714)  BP: 107/70 (01/19/23 0742)  SpO2: (!) 94 % (01/19/23 0742) Vital Signs (24h Range):  Temp:  [97.8 °F (36.6 °C)-98.4 °F (36.9 °C)] 98 °F (36.7 °C)  Pulse:  [66-90] 84  Resp:  [18-20] 20  SpO2:  [92 %-99 %] 94 %  BP: (101-114)/(69-73) 107/70       Physical Examination:  General: Patient resting comfortably, in no acute distress, on 2L satting 97%  Eye: pupils equal, EOMI, clear conjunctiva, eyelids normal  HENT: Head-normocephalic and atraumatic  Neck: full range of motion, no thyromegaly or lymphadenopathy, trachea midline, supple  Respiratory: clear to auscultation bilaterally without wheezes, rales, rhonchi  Cardiovascular: regular rate and rhythm without murmurs.  No gallops or rubs, no JVD.  Capillary refill within normal  limits.  Gastrointestinal: soft, non-tender, non-distended with normal bowel sounds, without masses to palpation  Genitourinary: no CVA tenderness to palpation  Musculoskeletal: full range of motion of all extremities/spine without limitation or discomfort  Integumentary: no rashes or skin lesions present  Neurologic: no signs of peripheral neurological deficit, motor/sensory function intact  Psychiatric:  alert and oriented, cognitive function intact, cooperative with exam      Laboratory:  Most Recent Data:  Recent Lab Results         01/19/23  0320        Albumin/Globulin Ratio 0.8       Albumin 3.0       Alkaline Phosphatase 52       ALT 10       AST 12       Baso # 0.01       Basophil % 0.1       BILIRUBIN TOTAL 0.2       BUN 15.0       Calcium 9.5       Chloride 109       CO2 22       Creatinine 0.80       eGFR >60       Eos # 0.00       Eosinophil % 0.0       Globulin, Total 3.8       Glucose 132       Hematocrit 38.1       Hemoglobin 12.3       Immature Grans (Abs) 0.05       Immature Granulocytes 0.6       Lymph # 0.65       LYMPH % 7.2       MCH 30.4       MCHC 32.3       MCV 94.3       Mono # 0.28       Mono % 3.1       MPV 10.3       Neut # 8.02       Neut % 89.0       nRBC 0.0       Platelets 286       Potassium 4.5       PROTEIN TOTAL 6.8       RBC 4.04       RDW 13.2       Sodium 139       WBC 9.0                 Microbiology Data Reviewed: yes  Pertinent Findings:  Blood and respiratory cultures pending  Gram stain of sputum many gram + rods and gram + cocci, gram neg rods      Radiology:  No new imaging      Current Medications:     Infusions:       Scheduled:   albuterol-ipratropium  3 mL Nebulization Q4H WAKE    azithromycin  500 mg Intravenous Q24H    cefTRIAXone (ROCEPHIN) IVPB  1 g Intravenous Q24H    famotidine  20 mg Oral Daily    methylPREDNISolone sodium succinate injection  60 mg Intravenous Q12H    montelukast  10 mg Oral Daily        PRN:  acetaminophen, guaiFENesin 100 mg/5 ml,  melatonin, sodium chloride 0.9%    Antibiotics and Day Number of Therapy:  Azithromycin and rocephin initiated 1/17      Intake/Output Summary (Last 24 hours) at 1/19/2023 0828  Last data filed at 1/19/2023 0401  Gross per 24 hour   Intake 660 ml   Output --   Net 660 ml       Lines/Drains/Airways       Peripheral Intravenous Line  Duration                  Peripheral IV - Single Lumen 01/17/23 0626 20 G Anterior;Distal;Left Forearm 2 days                      Assessment & Plan:     Suspected asthma exacerbation, possibly 2/2 medication noncompliance vs. CAP  Acute hypoxemic respiratory failure   - Home meds include albuterol and Breo, however pt has only been taking intermittently. Have discussed proper daily use with pt and she voiced understanding  - On arrival O2 sat was 90% on 4L  - Flu/covid negative  - On admission: Received solumedrol 125 mg x1 in ED, terbutaline and duonebs x2, SIRS 3/4 (tachycardia, tachypnea, leukocytosis of 12.1) however will not initiate sepsis dose fluids due to low suspicion for septic shock; heart rate and respiratory rate improved with tx and supplemental oxygen  - Wells score indicated moderate risk for PE on admission, however CT PE negative, did show RLL opacity suspicious for pneumonia  - On previous admission IgE 1799, likely would benefit from allergy/immuno follow up. Initiated singulair daily and will rx on dc.  - continue to wean oxygen as tolerated, 6 minute walk test today.  - Incentive spirometry  - Resp culture and gram stain ngtd  - Started on azithromycin and rocephin for presumed CAP, low suspicion for MRSA/pseudomonas  - Solumedrol 60 mg BID, will wean today  - Will continue pepcid while on steroids     Anion gap metabolic acidosis, resolved  - AG 13 on admission, resolved on day 2  - BUN within normal limits, renal indices at baseline, lactic acid normal, BG within normal limits  - salicylate level and EtOH negative  - will continue to monitor with daily  labs      CODE STATUS: Full Code  Access: Peripheral  Antibiotics: rocephin, azithro  Diet: Regular  DVT Prophylaxis: Teds/SCDs  GI Prophylaxis: pepcid  Fluids: none    Disposition: still requiring supplemental oxygen, will try to wean as tolerated today, on IV steroids and abx        Grisel Guan,   [facility] [facility]  HO-1

## 2023-01-19 NOTE — PLAN OF CARE
01/19/23 1101   Discharge Assessment   Assessment Type Discharge Planning Assessment   Confirmed/corrected address, phone number and insurance Yes   Confirmed Demographics Correct on Facesheet   Source of Information patient   When was your last doctors appointment?   (Giovany)   Reason For Admission Hypoxemia, Asthma, PN   People in Home significant other  (Corbin Sharma)   Do you expect to return to your current living situation? Yes   Do you have help at home or someone to help you manage your care at home? Yes   Who are your caregiver(s) and their phone number(s)? Corbin Sharma [phone]   Prior to hospitilization cognitive status: Unable to Assess   Current cognitive status: Alert/Oriented   Equipment Currently Used at Home nebulizer   Patient currently being followed by outpatient case management? No   Do you currently have service(s) that help you manage your care at home? No   Do you take prescription medications? Yes   Do you have prescription coverage? Yes   Coverage [facility]   Do you have any problems affording any of your prescribed medications? No   Is the patient taking medications as prescribed? yes   Who is going to help you get home at discharge? Family   How do you get to doctors appointments? car, drives self   Are you on dialysis? No   Do you take coumadin? No   Discharge Plan A Home   DME Needed Upon Discharge  none   Discharge Plan discussed with: Spouse/sig other;Patient   Name(s) and Number(s) Corbin Sharma [phone]   Discharge Barriers Identified None   Physical Activity   On average, how many days per week do you engage in moderate to strenuous exercise (like a brisk walk)? 0 days   On average, how many minutes do you engage in exercise at this level? 0 min   Financial Resource Strain   How hard is it for you to pay for the very basics like food, housing, medical care, and heating? Not hard   Housing Stability   In the last 12 months, was there  a time when you were not able to pay the mortgage or rent on time? N   In the last 12 months, how many places have you lived? 1   In the last 12 months, was there a time when you did not have a steady place to sleep or slept in a shelter (including now)? N   Transportation Needs   In the past 12 months, has lack of transportation kept you from medical appointments or from getting medications? no   In the past 12 months, has lack of transportation kept you from meetings, work, or from getting things needed for daily living? No   Food Insecurity   Within the past 12 months, you worried that your food would run out before you got the money to buy more. Never true   Within the past 12 months, the food you bought just didn't last and you didn't have money to get more. Never true   Stress   Do you feel stress - tense, restless, nervous, or anxious, or unable to sleep at night because your mind is troubled all the time - these days? Not at all   Social Connections   In a typical week, how many times do you talk on the phone with family, friends, or neighbors? More than 3   How often do you get together with friends or relatives? More than 3   How often do you attend Zoroastrian or Mu-ism services? Never   Do you belong to any clubs or organizations such as Zoroastrian groups, unions, fraternal or athletic groups, or school groups? No   How often do you attend meetings of the clubs or organizations you belong to? Never   Are you , , , , never , or living with a partner? Never marrie   Alcohol Use   Q1: How often do you have a drink containing alcohol? Never   Q2: How many drinks containing alcohol do you have on a typical day when you are drinking? None   Q3: How often do you have six or more drinks on one occasion? Never

## 2023-01-19 NOTE — DISCHARGE SUMMARY
"[facility] Internal Medicine Discharge Summary    Admitting Physician: Tim Adair MD  Attending Physician: Tim Adair MD  Date of Admit: 1/17/2023  Date of Discharge: 1/19/2023    Condition: Good  Outcome: Condition has improved and patient is now ready for discharge.  DISPOSITION: Home or Self Care        Discharge Diagnoses:     Patient Active Problem List   Diagnosis    Dysmenorrhea    Irregular menses    Menometrorrhagia    Reactive airway disease with acute exacerbation    SOB (shortness of breath)    Moderate persistent asthma with status asthmaticus       Principal Problem:  Reactive airway disease with acute exacerbation    Consultants and Procedures:     Consultants:  IP CONSULT TO HOSPITAL MEDICINE    Procedures:   * No surgery found *      Brief Admission History:      Komal Catherine is a 22 y.o. female who  has a past medical history of ADHD (attention deficit hyperactivity disorder) and Anxiety. The patient presented to [facility] ED on 1/17/2023 with a primary complaint of shortness of breath and low O2 sats (measured by device she has at home). She states that in late 2021 and early 2022 she had Covid, and since then has struggled with respiratory symptoms including shortness of breath and recurrent monthly infections (sinusitis and pneumonia). She checks her pulse ox at home and noticed this morning upon awakening that it was in the low 80s so she went to ED. She reports recent sinus trouble including congestion, cough, and production of yellow-green sputum. She denies fever/chills. She also reports chest tightness which she describes as someone sitting on her chest. She also states that she has trouble taking a deep breath and describes air hunger. When she takes a deep breath she feels that she still is not getting enough air. She smokes marijuana occasionally  (about once weekly), admits to prior history of "heavy smoking" with both marijuana and cigarettes but states that she has cut down a " great deal, unable to specify exactly how many cigs/day. She has 2 cats at home, but no other animal exposure. She has had the same cats for the past 2 years and does not attribute any worsening symptoms to exposure to her cats and is unable to list any other certain exposures that make her symptoms worse. She states that she had eczema as a child but denies personal or family history of pulmonary, autoimmune, or dermatologic disease.  She was recently hospitalized for a similar complaint and was admitted for 24 hours due to suspected asthma exacerbation with pneumonia. At that point, she was prescribed Breo Ellipta but she states that she has only been taking it PRN. She was also discharged with albuterol and has been taking it a few times per day. At that time she was also scheduled to see a pulmonologist and to complete PFTs but has not had those appts yet.     In the ED she was started on 4 L NC, was tachycardic and tachypnic, afebrile. Noted to have mild leukocytosis of 12.1. CXR was negative for acute pathology, CT chest negative for PE but showed right lower lobe opacity suspicious for pneumonia. Internal medicine consulted for admission.    Hospital Course with Pertinent Findings:      Patient was admitted to med-surg unit for asthma exacerbation with concurrent CAP requiring supplemental oxygen, remained on 3L for the first day of hospitalization and was weaned to 2L, on room air maintaining O2 sat in upper 90s on the day of discharge. CT PE was performed on admission which was negative. Wheezing is much improved on the day of dc. She received 60 mg solumedrol BID, was weaned to 60 once daily and was ultimately discharged with a prednisone taper and pepcid. Will continue abx for total of 5 days, will dc with po levofloxacin. Will refer to allergy/immunology, as pt has had previous IgE of 1700 and history of eczema, likely allergic component to her asthma, pt would also likely benefit from immunology work up  "given recurrent pneumonia, second admission within 1 month. On the day of dc 6 minute walk test completed and she maintained appropriate O2 sat.    Discharge physical exam:  Vitals  BP: 107/70  Temp: 98 °F (36.7 °C)  Temp src: Oral  Pulse: 76  Resp: 18  SpO2: 95 %  Height: 5' 2" (157.5 cm)  Weight: 76.5 kg (168 lb 11.2 oz)        TIME SPENT ON DISCHARGE: 35 minutes    Discharge Medications:         Medication List        START taking these medications      famotidine 20 MG tablet  Commonly known as: PEPCID  Take 1 tablet (20 mg total) by mouth once daily.  Start taking on: January 20, 2023     guaiFENesin 100 mg/5 ml 100 mg/5 mL syrup  Commonly known as: ROBITUSSIN  Take 10 mLs (200 mg total) by mouth every 4 (four) hours as needed for Cough or Congestion.     levoFLOXacin 750 MG tablet  Commonly known as: LEVAQUIN  Take 1 tablet (750 mg total) by mouth once daily. for 3 days     montelukast 5 MG chewable tablet  Commonly known as: SINGULAIR  Take 2 tablets (10 mg total) by mouth once daily.  Start taking on: January 20, 2023     predniSONE 10 MG tablet  Commonly known as: DELTASONE  Take 6 tablets (60 mg total) by mouth once daily for 1 day, THEN 4 tablets (40 mg total) once daily for 1 day, THEN 2 tablets (20 mg total) once daily for 1 day, THEN 1 tablet (10 mg total) once daily for 1 day, THEN 0.5 tablets (5 mg total) once daily for 1 day.  Start taking on: January 19, 2023            CHANGE how you take these medications      albuterol 2.5 mg /3 mL (0.083 %) nebulizer solution  Commonly known as: PROVENTIL  Take 3 mLs (2.5 mg total) by nebulization every 4 (four) hours as needed (shortness of breath). Rescue  What changed: Another medication with the same name was removed. Continue taking this medication, and follow the directions you see here.            CONTINUE taking these medications      ethynodiol-ethinyl estradiol 1-35 mg-mcg per tablet  Commonly known as: KELNOR  Take 1 tablet by mouth once daily.   "   fluticasone furoate-vilanteroL 100-25 mcg/dose diskus inhaler  Commonly known as: BREO  Inhale 1 puff into the lungs once daily. Controller                Discharge Instructions:         Komal Catherine is being discharged:    Take levofloxacin for 3 additional days beginning 1/20, continue prednisone taper as prescribed, take pepcid while on prednisone.  Additionally prescribed singulair.  Use Breo inhaler DAILY not prn      At this time, Komal Catherine is determined to have maximized benefits of IP hospitalization. she is discharged in stable condition with OP f/u recommendations and instructions. All questions answered, and patient verbalized agreement with the POC. They were given return precautions prior to d/c including symptoms that should prompt return to ED or to call PCP. Total time spent of DC of 35 minutes.       Grisel Guan DO  \[facility] [place] [facility]\"" Internal Medicine  HO-1

## 2023-01-19 NOTE — MEDICAL/APP STUDENT
"[facility] Medicine Wards   Progress Note     Resident Team: [facility] Medicine List 3  Attending Physician: Tim Adair MD  Resident: Yanick Bland  Intern: Karla Guan Carbajal     Subjective:      Brief HPI:    anup Catherine is a 22 y.o. female who  has a past medical history of ADHD (attention deficit hyperactivity disorder) and Anxiety. The patient presented to [facility] ED on 1/17/2023 with a primary complaint of shortness of breath and low O2 sats (measured by device she has at home). She states that in late 2021 and early 2022 she had Covid, and since then has struggled with respiratory symptoms including shortness of breath and recurrent monthly infections (sinusitis and pneumonia). She checks her pulse ox at home and noticed this morning upon awakening that it was in the low 80s so she went to ED. She reports recent sinus trouble including congestion, cough, and production of yellow-green sputum. She denies fever/chills. She also reports chest tightness which she describes as someone sitting on her chest. She also states that she has trouble taking a deep breath and describes air hunger. When she takes a deep breath she feels that she still is not getting enough air. She smokes marijuana occasionally  (about once weekly), admits to prior history of "heavy smoking" with both marijuana and cigarettes but states that she has cut down a great deal, unable to specify exactly how many cigs/day. She has 2 cats at home, but no other animal exposure. She has had the same cats for the past 2 years and does not attribute any worsening symptoms to exposure to her cats and is unable to list any other certain exposures that make her symptoms worse. She states that she had eczema as a child but denies personal or family history of pulmonary, autoimmune, or dermatologic disease.  She was recently hospitalized for a similar complaint and was admitted for 24 hours due to suspected asthma exacerbation with pneumonia. At that " point, she was prescribed Breo Ellipta but she states that she has only been taking it PRN. She was also discharged with albuterol and has been taking it a few times per day. At that time she was also scheduled to see a pulmonologist and to complete PFTs but has not had those appts yet.     In the ED she was started on 4 L NC, was tachycardic and tachypnic, afebrile. Noted to have mild leukocytosis of 12.1. CXR was negative for acute pathology, CT chest negative for PE but showed right lower lobe opacity suspicious for pneumonia. Internal medicine consulted for admission.     Interval History:   NAEON. Weaned to 2L NC and sating %. Couldn't perform 6 min walk yesterday due to sating at 89% on RA, will try again today. Denies any SOB or coughing.      Review of Systems:  Pertinent ROS negative unless otherwise stated above.       Objective:     Vital Signs (Most Recent):  Temp: 97.9 °F (36.6 °C) (01/19/23 0334)  Pulse: 72 (01/19/23 0334)  Resp: 18 (01/19/23 0334)  BP: 106/69 (01/19/23 0334)  SpO2: 97 % (01/19/23 0334) Vital Signs (24h Range):  Temp:  [97.8 °F (36.6 °C)-98.4 °F (36.9 °C)] 97.9 °F (36.6 °C)  Pulse:  [65-90] 72  Resp:  [16-18] 18  SpO2:  [92 %-99 %] 97 %  BP: (101-114)/(69-73) 106/69       Physical Examination:  General: Patient resting comfortably, in no acute distress, on 2L satting 97%  Eye: pupils equal, EOMI, clear conjunctiva, eyelids normal  HENT: Head-normocephalic and atraumatic  Neck: full range of motion, no thyromegaly or lymphadenopathy, trachea midline, supple  Respiratory: clear to auscultation bilaterally without wheezes, rhonchi  Cardiovascular: regular rate and rhythm without murmurs.  No gallops or rubs, no JVD.  Capillary refill within normal limits.  Gastrointestinal: soft, non-tender, non-distended with normal bowel sounds, without masses to palpation  Genitourinary: no CVA tenderness to palpation  Musculoskeletal: full range of motion of all extremities/spine without  limitation or discomfort  Integumentary: no rashes or skin lesions present  Neurologic: no signs of peripheral neurological deficit, motor/sensory function intact  Psychiatric:  alert and oriented, cognitive function intact, cooperative with exam      Laboratory:  Most Recent Data:  CBC:   Recent Labs   Lab 01/17/23  0638 01/18/23  0316 01/19/23  0320   WBC 12.1* 10.9 9.0   HGB 15.7 12.3 12.3   HCT 48.7* 37.7 38.1    267 286     CMP:   Recent Labs   Lab 01/19/23  0320   CALCIUM 9.5   ALBUMIN 3.0*      K 4.5   CO2 22   BUN 15.0   CREATININE 0.80   ALKPHOS 52   ALT 10   AST 12   BILITOT 0.2         Microbiology Data Reviewed: yes  Pertinent Findings:  Blood culture: no growth at 24 hrs 2/2  Respiratory culture: normal uvaldo   Gram stain: gram pos rods, gram pos cocci, gram neg rods     Radiology:  No new Imaging     Imaging Results              CTA Chest Non-Coronary (PE Studies) (Final result)  Result time 01/17/23 10:14:43      Final result by Ramón Calderon MD (01/17/23 10:14:43)                   Impression:      1. No pulmonary embolus seen.  2. Patchy right lower lobe opacities compatible with pneumonia.      Electronically signed by: Ramón Calderon  Date:    01/17/2023  Time:    10:14               Narrative:    EXAMINATION:  CTA CHEST NON CORONARY (PE STUDIES)    CLINICAL HISTORY:  Pulmonary embolism (PE) suspected, neg D-dimer;    TECHNIQUE:  Helical acquisition through the chest with IV contrast targeting the pulmonary arteries. Multiplanar and 3D MIP reconstructed images were provided for review.  mGycm. Automatic exposure control, adjustment of mA/kV or iterative reconstruction technique was used to reduce radiation.    COMPARISON:  None available.    FINDINGS:  There is some motion and streak artifact..  There is no convincing pulmonary embolus.  There is no aortic dissection.  There is no aortic dissection.    Few prominent hilar lymph nodes may be reactive.  No enlarged mediastinal or  axillary lymph nodes.    Heart is normal in size. No pericardial effusion.    No pleural effusion.  There are some patchy nodular opacities in the right lower lobe.  There is no dense consolidation.    No acute findings in the imaged upper abdomen.  There are no acute osseous findings.                                       X-Ray Chest 1 View (Final result)  Result time 01/17/23 06:34:56      Final result by Ramón Calderon MD (01/17/23 06:34:56)                   Impression:      No acute findings.      Electronically signed by: Ramón Calderon  Date:    01/17/2023  Time:    06:34               Narrative:    EXAMINATION:  XR CHEST 1 VIEW    CLINICAL HISTORY:  Dyspnea;    COMPARISON:  26 December 2022    FINDINGS:  Portable frontal view of the chest was obtained. The heart is not enlarged.  Lungs are clear.  There is no pneumothorax or significant effusion.                                      Current Medications:     Infusions:       Scheduled:   albuterol-ipratropium  3 mL Nebulization Q4H WAKE    azithromycin  500 mg Intravenous Q24H    cefTRIAXone (ROCEPHIN) IVPB  1 g Intravenous Q24H    famotidine  20 mg Oral Daily    methylPREDNISolone sodium succinate injection  60 mg Intravenous Q12H    montelukast  10 mg Oral Daily        PRN:  acetaminophen, guaiFENesin 100 mg/5 ml, melatonin, sodium chloride 0.9%    Antibiotics and Day Number of Therapy:  Azithromycin and ceftriaxone both initialed 1/17      Intake/Output Summary (Last 24 hours) at 1/19/2023 0622  Last data filed at 1/19/2023 0401  Gross per 24 hour   Intake 360 ml   Output --   Net 360 ml       Lines/Drains/Airways       Peripheral Intravenous Line  Duration                  Peripheral IV - Single Lumen 01/17/23 0626 20 G Anterior;Distal;Left Forearm 1 day                      Assessment & Plan:     Suspected asthma exacerbation, possibly 2/2 medication noncompliance vs. CAP  Acute hypoxemic respiratory failure   - Home meds include albuterol and Breo,  however pt has only been taking intermittently  - On arrival O2 sat was 90% on 4L  - Flu/covid negative  - Received solumedrol 125 mg x1 in ED, terbutaline and duonebs x2  - SIRS 3/4 (tachycardia, tachypnea, leukocytosis of 12.1) however will not initiate sepsis dose fluids due to low suspicion for septic shock; heart rate and respiratory rate improved with tx and supplemental oxygen  - Wells score indicated moderate risk for PE on admission, however CT PE negative, did show RLL opacity suspicious for pneumonia  - On previous admission IgE 1799, likely would benefit from allergy/immuno follow up  - Will wean oxygen as tolerated  - Incentive spirometry  - ABG indicative of respiratory alkalosis  - Resp culture and gram stain ordered  - Started on azithromycin and rocephin for presumed CAP, low suspicion for MRSA/pseudomonas  - Solumedrol 60 mg daily; start tapering steroids today   - Will continue pepcid while on steroids     Anion gap metabolic acidosis, resolved  - AG 13 on admission, resolved on day 2  - BUN within normal limits, renal indices at baseline, lactic acid normal, BG within normal limits  - salicylate level and EtOH negative  - will continue to monitor with daily labs        CODE STATUS: Full Code  Access: Peripheral  Antibiotics: rocephin, azithro  Diet: Regular  DVT Prophylaxis: Teds/SCDs  GI Prophylaxis: pepcid  Fluids: none     Disposition: sating in the high 90s on 2L NC, will try to wean as tolerated today, on IV steroids and abx; will start tapering steroids, perform 6 minute walk today      Tram De Los Santos  MS3 [facility]

## 2023-01-19 NOTE — NURSING
Patient's O2 removed- patient's O2 dropped to 89%. Six minute walk test not complete due to patient's oxygen dropping at rest.

## 2023-01-22 LAB
BACTERIA BLD CULT: NORMAL
BACTERIA BLD CULT: NORMAL

## 2023-01-23 ENCOUNTER — OFFICE VISIT (OUTPATIENT)
Dept: INTERNAL MEDICINE | Facility: CLINIC | Age: 23
End: 2023-01-23
Payer: COMMERCIAL

## 2023-01-23 VITALS
BODY MASS INDEX: 31.1 KG/M2 | WEIGHT: 169 LBS | TEMPERATURE: 98 F | OXYGEN SATURATION: 95 % | HEART RATE: 93 BPM | HEIGHT: 62 IN | SYSTOLIC BLOOD PRESSURE: 90 MMHG | DIASTOLIC BLOOD PRESSURE: 64 MMHG | RESPIRATION RATE: 16 BRPM

## 2023-01-23 DIAGNOSIS — R06.02 SOB (SHORTNESS OF BREATH): ICD-10-CM

## 2023-01-23 DIAGNOSIS — J45.50 SEVERE PERSISTENT ASTHMA, UNSPECIFIED WHETHER COMPLICATED: Primary | ICD-10-CM

## 2023-01-23 PROCEDURE — 99215 OFFICE O/P EST HI 40 MIN: CPT | Mod: PBBFAC

## 2023-01-23 RX ORDER — ALBUTEROL SULFATE 90 UG/1
2 AEROSOL, METERED RESPIRATORY (INHALATION)
COMMUNITY
Start: 2022-10-24 | End: 2023-01-23 | Stop reason: SDUPTHER

## 2023-01-23 NOTE — PROGRESS NOTES
[facility]    Subjective:        Komal Catherine is a 22 y.o. female who  has a past medical history of ADHD (attention deficit hyperactivity disorder) and Anxiety.  She presents to clinic today for follow-up of chronic medical conditions.  Patient was admitted to the hospital January 17, 2023 for asthma exacerbation complicated by community-acquired pneumonia.  Patient was discharged January 19, 2023 to complete Levaquin for 5 days and steroid taper from 60 mg in which patient his near completion.  Patient has been checking her O2 sats at home and states that it has been 95-96% since she was discharged.  She states that she does have some wheezing but is better when she coughs.  She has never had a history of asthma as a child unable to pinpoint a a trigger except from the 2 colds she has had this past month that required her to be admitted to the hospital on 12/26/2022.  Patient was given immunologist referral upon discharge patient states that she was unable to get in touch with them.  Patient denies fever, chills, lightheadedness, dizziness, chest pain, shortness a breath, hemoptysis, abdominal pain, nausea, vomiting, hematuria, dysuria or peripheral edema.    Review of Systems:  10 point ROS negative except for HPI    Objective:   Vital Signs:  Vitals:    01/23/23 1306   BP: 90/64   Pulse: 93   Resp: 16   Temp: 98.1 °F (36.7 °C)          Body mass index is 30.91 kg/m².     General:  Well developed, well nourished, no acute respiratory distress  Head: Normocephalic, atraumatic  Eyes: PERRL, EOMI, anicteric sclera  Throat: No posterior pharyngeal erythema or exudate, no tonsillar exudate  Neck: supple, normal ROM, no thyromegaly   CVS:  RRR, S1 and S2 normal, no murmurs, no added heart sounds, rubs, gallops, 2+ peripheral pulses  Resp:  Mild wheezing in bilateral upper lobes lung PA all other lobes are clear to auscultation bilaterally  GI:  Abdomen soft, non-tender, non-distended, normoactive bowel  sounds  MSK:  No muscle atrophy, cyanosis, peripheral edema, full range of motion  Skin:  No rashes, ulcers, erythema  Neuro:  Alert and oriented x3, No focal neuro deficits, CNII-XII grossly intact  Psych:  Appropriate mood and affect         Laboratory:  Lab Results   Component Value Date    WBC 9.0 01/19/2023    HGB 12.3 01/19/2023    HCT 38.1 01/19/2023     01/19/2023    MCV 94.3 (H) 01/19/2023    RDW 13.2 01/19/2023    Lab Results   Component Value Date     01/19/2023    K 4.5 01/19/2023    CO2 22 01/19/2023    BUN 15.0 01/19/2023    CREATININE 0.80 01/19/2023    CALCIUM 9.5 01/19/2023    MG 2.00 01/18/2023      Lab Results   Component Value Date    HGBA1C 5.0 12/26/2022    EAG 96.8 12/26/2022    CREATININE 0.80 01/19/2023    Lab Results   Component Value Date    TSH 0.749 12/26/2022                .labDM:   Lab Results   Component Value Date    HGBA1C 5.0 12/26/2022    EAG 96.8 12/26/2022    CREATININE 0.80 01/19/2023     Thyroid:   Lab Results   Component Value Date    TSH 0.749 12/26/2022     Anemia: No results found for: IRON, TIBC, FERRITIN, [id number], FOLATE    Current Medications:  Current Outpatient Medications   Medication Instructions    albuterol (PROVENTIL) 2.5 mg, Nebulization, Every 4 hours PRN, Rescue    dupilumab 400 mg, Subcutaneous, Once    [START ON 2/7/2023] dupilumab 200 mg, Subcutaneous, Every 14 days    ethynodiol-ethinyl estradiol (KELNOR) 1-35 mg-mcg per tablet 1 tablet, Oral, Daily    famotidine (PEPCID) 20 mg, Oral, Daily    fluticasone furoate-vilanteroL (BREO) 100-25 mcg/dose diskus inhaler 1 puff, Inhalation, Daily, Controller    guaiFENesin 100 mg/5 ml (ROBITUSSIN) 200 mg, Oral, Every 4 hours PRN    montelukast (SINGULAIR) 10 mg, Oral, Daily    predniSONE (DELTASONE) 10 MG tablet Take 6 tablets (60 mg total) by mouth once daily for 1 day, THEN 4 tablets (40 mg total) once daily for 1 day, THEN 2 tablets (20 mg total) once daily for 1 day, THEN 1 tablet (10 mg total)  once daily for 1 day, THEN 0.5 tablets (5 mg total) once daily for 1 day.        Assessment and Plan:      Asthma exacerbation   -patient's continue montelukast  5 mg, Breo inhalers and rescue albuterol nebulizer  -referred patient to immunologist Dr. Janet Hernandez  -prescribed Dupixent  -ordered PFTs    Health Maintenance   Topic Date Due    Hepatitis C Screening  Never done    Lipid Panel  Never done    HPV Vaccines (1 - 2-dose series) Never done    Chlamydia Screening  Never done    TETANUS VACCINE  Never done                  Mike De Paz MD

## 2023-01-26 ENCOUNTER — TELEPHONE (OUTPATIENT)
Dept: INTERNAL MEDICINE | Facility: CLINIC | Age: 23
End: 2023-01-26
Payer: COMMERCIAL

## 2023-01-26 NOTE — TELEPHONE ENCOUNTER
Dr. De Paz,    The dupilumab 200mg/1.14 injection you ordered for patient was, out of network for insurance. Can you please send order to our pharmacy with DX code. They can order it and have it in 1-2 days. I spoke to [facility] specialty pharmacy @ [phone] after speaking to pharmacist at [facility] on [address].    Please advise

## 2023-01-27 ENCOUNTER — CLINICAL SUPPORT (OUTPATIENT)
Dept: INTERNAL MEDICINE | Facility: CLINIC | Age: 23
End: 2023-01-27
Payer: COMMERCIAL

## 2023-01-27 VITALS — HEIGHT: 62 IN | BODY MASS INDEX: 31.11 KG/M2 | WEIGHT: 169.06 LBS

## 2023-01-27 DIAGNOSIS — J45.42 MODERATE PERSISTENT ASTHMA WITH STATUS ASTHMATICUS: Primary | ICD-10-CM

## 2023-01-27 DIAGNOSIS — J82.83 EOSINOPHILIC ASTHMA: Primary | ICD-10-CM

## 2023-01-27 PROCEDURE — 99212 OFFICE O/P EST SF 10 MIN: CPT | Mod: PBBFAC

## 2023-01-27 NOTE — PROGRESS NOTES
"   Patient presented to [facility] for Instructions on proper technique giving Medication using Pen /subcutaneous injection to self. Patient has a history of moderate persistent asthma with status asthmaticus. With the use of Booklet "Type 2 Diabetes and adding Insulin" by Simin/printed 02/2019. Patient instructed on sites to use when giving herself subcutaneous injections. Patient instructed verbally, Visual demonstration on how to administer Dupixent /medication using and insulin pen. Patient was able to return the demonstration both verbally and visually.on administering medication subcutaneously using a medication pen. Patient given a copy of booklet " Type 2 diabetes and adding Insulin." Patient voiced full understanding of these instructions.  "

## 2023-02-01 NOTE — TELEPHONE ENCOUNTER
Pt notified that her RX was sent to her [facility] pharmacy as [facility] was considered out of network.

## 2023-02-28 PROCEDURE — 99282 EMERGENCY DEPT VISIT SF MDM: CPT | Mod: 25

## 2023-02-28 PROCEDURE — 12001 RPR S/N/AX/GEN/TRNK 2.5CM/<: CPT

## 2023-03-01 ENCOUNTER — HOSPITAL ENCOUNTER (EMERGENCY)
Facility: HOSPITAL | Age: 23
Discharge: HOME | End: 2023-03-01
Attending: FAMILY MEDICINE
Payer: MEDICAID

## 2023-03-01 VITALS
HEIGHT: 62 IN | TEMPERATURE: 98 F | RESPIRATION RATE: 20 BRPM | SYSTOLIC BLOOD PRESSURE: 109 MMHG | HEART RATE: 78 BPM | BODY MASS INDEX: 32.86 KG/M2 | WEIGHT: 178.56 LBS | OXYGEN SATURATION: 98 % | DIASTOLIC BLOOD PRESSURE: 74 MMHG

## 2023-03-01 DIAGNOSIS — S61.213A LACERATION OF LEFT MIDDLE FINGER WITHOUT FOREIGN BODY WITHOUT DAMAGE TO NAIL, INITIAL ENCOUNTER: Primary | ICD-10-CM

## 2023-03-01 PROCEDURE — 12001 RPR S/N/AX/GEN/TRNK 2.5CM/<: CPT

## 2023-03-01 NOTE — ED PROVIDER NOTES
Encounter Date: 2/28/2023       History     Chief Complaint   Patient presents with    Laceration     Pt has lac to tip of left middle finger. Pt was using a  and cut finger just prior to arrival, pt holding pressure, bleeding controlled, vss. Pt uncertain of tetanus status       Patient is a 22-year-old female presents emergency room after reporting to have had a laceration to her left middle finger secondary to using a vegetable Jose.  This occurred just prior to arrival.  No active bleeding.      The history is provided by the patient.   Review of patient's allergies indicates:  No Known Allergies  Past Medical History:   Diagnosis Date    ADHD (attention deficit hyperactivity disorder)     Anxiety      Past Surgical History:   Procedure Laterality Date    NOSE SURGERY      WISDOM TOOTH EXTRACTION       Family History   Problem Relation Age of Onset    No Known Problems Mother     No Known Problems Father     Breast cancer Neg Hx     Colon cancer Neg Hx     Ovarian cancer Neg Hx      Social History     Tobacco Use    Smoking status: Never    Smokeless tobacco: Never   Substance Use Topics    Alcohol use: Yes     Comment: occasional    Drug use: Yes     Types: Marijuana     Review of Systems   Constitutional:  Negative for chills, fatigue and fever.   HENT:  Negative for ear pain, rhinorrhea and sore throat.    Eyes:  Negative for photophobia and pain.   Respiratory:  Negative for cough, shortness of breath and wheezing.    Cardiovascular:  Negative for chest pain.   Gastrointestinal:  Negative for abdominal pain, diarrhea, nausea and vomiting.   Genitourinary:  Negative for dysuria.   Neurological:  Negative for dizziness, weakness and headaches.   All other systems reviewed and are negative.    Physical Exam     Initial Vitals [03/01/23 0047]   BP Pulse Resp Temp SpO2   109/74 78 20 98.2 °F (36.8 °C) 98 %      MAP       --         Physical Exam    Nursing note and vitals reviewed.  Constitutional:  She appears well-developed and well-nourished. No distress.   HENT:   Head: Normocephalic and atraumatic.   Eyes: Conjunctivae and EOM are normal. Pupils are equal, round, and reactive to light.   Neck: Neck supple.   Normal range of motion.  Cardiovascular:  Normal rate, regular rhythm and normal heart sounds.           Pulmonary/Chest: No respiratory distress. She has no wheezes. She has no rhonchi. She has no rales.   Abdominal: Abdomen is soft. Bowel sounds are normal. She exhibits no distension. There is no abdominal tenderness. There is no rebound and no guarding.   Musculoskeletal:         General: Normal range of motion.      Cervical back: Normal range of motion and neck supple.      Comments: Superficial laceration left distal phalanx of middle finger.  This is just below the nail.  No nail involvement.  No active bleeding.  Good approximation of the skin.  Superficial.     Neurological: She is alert and oriented to person, place, and time.   Skin: Skin is warm and dry. Capillary refill takes less than 2 seconds. No erythema.   Psychiatric: She has a normal mood and affect. Her behavior is normal. Judgment and thought content normal.       ED Course   Lac Repair    Date/Time: 3/1/2023 2:15 AM  Performed by: Patel Medina MD  Authorized by: Patel Medina MD     Consent:     Consent obtained:  Verbal    Consent given by:  Patient    Risks discussed:  Infection    Alternatives discussed:  No treatment  Universal protocol:     Procedure explained and questions answered to patient or proxy's satisfaction: yes      Patient identity confirmed:  Verbally with patient  Anesthesia:     Anesthesia method:  None  Laceration details:     Location:  Finger    Finger location:  L long finger    Length (cm):  1    Depth (mm):  1  Treatment:     Area cleansed with:  Saline    Amount of cleaning:  Standard  Skin repair:     Repair method:  Tissue adhesive  Approximation:     Approximation:  Close  Repair  type:     Repair type:  Simple  Post-procedure details:     Dressing:  Open (no dressing)    Procedure completion:  Tolerated well, no immediate complications  Labs Reviewed - No data to display       Imaging Results    None          Medications - No data to display                           Clinical Impression:   Final diagnoses:  [K44.213A] Laceration of left middle finger without foreign body without damage to nail, initial encounter (Primary)        ED Disposition Condition    Discharge Stable          ED Prescriptions    None       Follow-up Information       Follow up With Specialties Details Why Contact Info    [facility] Emergency Medicine  As needed, If symptoms worsen [address] [facility] [address] [phone]    Primary Care Physician  In 5 days               Patel Medina MD  03/01/23 0239

## 2024-08-05 ENCOUNTER — LAB VISIT (OUTPATIENT)
Dept: LAB | Facility: HOSPITAL | Age: 24
End: 2024-08-05
Attending: STUDENT IN AN ORGANIZED HEALTH CARE EDUCATION/TRAINING PROGRAM
Payer: MEDICAID

## 2024-08-05 DIAGNOSIS — J01.91 RECURRENT ACUTE SINUSITIS: ICD-10-CM

## 2024-08-05 DIAGNOSIS — D72.10 EOSINOPHILIA: ICD-10-CM

## 2024-08-05 DIAGNOSIS — J30.1 ALLERGIC RHINITIS DUE TO POLLEN: ICD-10-CM

## 2024-08-05 DIAGNOSIS — J45.50 SEVERE PERSISTENT ASTHMA: Primary | ICD-10-CM

## 2024-08-05 DIAGNOSIS — J30.81 ALLERGIC RHINITIS DUE TO ANIMAL (CAT) (DOG) HAIR AND DANDER: ICD-10-CM

## 2024-08-05 PROCEDURE — 86003 ALLG SPEC IGE CRUDE XTRC EA: CPT

## 2024-08-05 PROCEDURE — 86003 ALLG SPEC IGE CRUDE XTRC EA: CPT | Mod: 59

## 2024-08-05 PROCEDURE — 36415 COLL VENOUS BLD VENIPUNCTURE: CPT

## 2024-08-05 PROCEDURE — 86317 IMMUNOASSAY INFECTIOUS AGENT: CPT

## 2024-08-08 LAB
ALLERGEN CRAB IGE (OLG): <0.1 KUA/L
ALLERGEN SHRIMP IGE (OLG): 0.15 KUA/L
PHADIOTOP IGE QN: 2087 KU/L

## 2024-08-12 LAB
IMMUNOLOGIST REVIEW: NORMAL
S PN DA SERO 19F IGG SER-MCNC: 27.1 MCG/ML
S PNEUM DA 1 IGG SER-MCNC: NORMAL MCG/ML
S PNEUM DA 10A IGG SER-MCNC: 3.3 MCG/ML
S PNEUM DA 11A IGG SER-MCNC: 4.3 MCG/ML
S PNEUM DA 12F IGG SER-MCNC: 0.4 MCG/ML
S PNEUM DA 14 IGG SER-MCNC: 13.6 MCG/ML
S PNEUM DA 15B IGG SER-MCNC: 4.3 MCG/ML
S PNEUM DA 17F IGG SER-MCNC: 14.9 MCG/ML
S PNEUM DA 18C IGG SER-MCNC: 5.1 MCG/ML
S PNEUM DA 19A IGG SER-MCNC: 4.9 MCG/ML
S PNEUM DA 2 IGG SER-MCNC: >100 MCG/ML
S PNEUM DA 20A IGG SER-MCNC: 5.2 MCG/ML
S PNEUM DA 22F IGG SER-MCNC: 23 MCG/ML
S PNEUM DA 23F IGG SER-MCNC: 11.6 MCG/ML
S PNEUM DA 3 IGG SER-MCNC: 0.8 MCG/ML
S PNEUM DA 33F IGG SER-MCNC: 4.5 MCG/ML
S PNEUM DA 4 IGG SER-MCNC: 1.6 MCG/ML
S PNEUM DA 5 IGG SER-MCNC: 1.1 MCG/ML
S PNEUM DA 6B IGG SER-MCNC: 9.5 MCG/ML
S PNEUM DA 7F IGG SER-MCNC: 5.6 MCG/ML
S PNEUM DA 8 IGG SER-MCNC: 8.3 MCG/ML
S PNEUM DA 9N IGG SER-MCNC: 2.4 MCG/ML
S PNEUM DA 9V IGG SER-MCNC: 1.7 MCG/ML